# Patient Record
Sex: FEMALE
[De-identification: names, ages, dates, MRNs, and addresses within clinical notes are randomized per-mention and may not be internally consistent; named-entity substitution may affect disease eponyms.]

---

## 2018-09-28 ENCOUNTER — HOSPITAL ENCOUNTER (EMERGENCY)
Dept: HOSPITAL 82 - ED | Age: 53
Discharge: HOME | DRG: 563 | End: 2018-09-28
Payer: COMMERCIAL

## 2018-09-28 VITALS — DIASTOLIC BLOOD PRESSURE: 85 MMHG | SYSTOLIC BLOOD PRESSURE: 112 MMHG

## 2018-09-28 VITALS — WEIGHT: 178.57 LBS | BODY MASS INDEX: 30.49 KG/M2 | HEIGHT: 64 IN

## 2018-09-28 DIAGNOSIS — M19.90: ICD-10-CM

## 2018-09-28 DIAGNOSIS — M35.00: ICD-10-CM
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
## 2018-09-28 DIAGNOSIS — S92.502A: Primary | ICD-10-CM

## 2018-09-28 DIAGNOSIS — S43.402A: ICD-10-CM
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
## 2018-09-28 DIAGNOSIS — E11.9: ICD-10-CM

## 2018-09-28 DIAGNOSIS — M79.7: ICD-10-CM

## 2018-09-28 DIAGNOSIS — M06.9: ICD-10-CM

## 2021-08-14 ENCOUNTER — HOSPITAL ENCOUNTER (INPATIENT)
Dept: HOSPITAL 82 - ED | Age: 56
LOS: 20 days | Discharge: HOME | DRG: 207 | End: 2021-09-03
Attending: INTERNAL MEDICINE | Admitting: INTERNAL MEDICINE
Payer: COMMERCIAL

## 2021-08-14 VITALS — SYSTOLIC BLOOD PRESSURE: 118 MMHG | DIASTOLIC BLOOD PRESSURE: 77 MMHG

## 2021-08-14 VITALS — DIASTOLIC BLOOD PRESSURE: 80 MMHG | SYSTOLIC BLOOD PRESSURE: 122 MMHG

## 2021-08-14 VITALS — WEIGHT: 163.58 LBS | HEIGHT: 64 IN | BODY MASS INDEX: 27.93 KG/M2

## 2021-08-14 VITALS — SYSTOLIC BLOOD PRESSURE: 118 MMHG | DIASTOLIC BLOOD PRESSURE: 79 MMHG

## 2021-08-14 DIAGNOSIS — E11.9: ICD-10-CM

## 2021-08-14 DIAGNOSIS — J12.82: ICD-10-CM

## 2021-08-14 DIAGNOSIS — M06.9: ICD-10-CM

## 2021-08-14 DIAGNOSIS — E87.6: ICD-10-CM

## 2021-08-14 DIAGNOSIS — J96.01: ICD-10-CM

## 2021-08-14 DIAGNOSIS — E87.70: ICD-10-CM

## 2021-08-14 DIAGNOSIS — Z63.4: ICD-10-CM

## 2021-08-14 DIAGNOSIS — M19.90: ICD-10-CM

## 2021-08-14 DIAGNOSIS — M35.00: ICD-10-CM

## 2021-08-14 DIAGNOSIS — Z79.4: ICD-10-CM

## 2021-08-14 DIAGNOSIS — F32.9: ICD-10-CM

## 2021-08-14 DIAGNOSIS — U07.1: Primary | ICD-10-CM

## 2021-08-14 DIAGNOSIS — M79.7: ICD-10-CM

## 2021-08-14 LAB
ALBUMIN SERPL-MCNC: 3.4 G/DL (ref 3.2–5)
ALP SERPL-CCNC: 174 U/L (ref 38–126)
ANION GAP SERPL CALCULATED.3IONS-SCNC: 13 MMOL/L
AST SERPL-CCNC: 137 U/L (ref 14–36)
BASOPHILS NFR BLD AUTO: 0 % (ref 0–3)
BUN SERPL-MCNC: 11 MG/DL (ref 7–17)
BUN/CREAT SERPL: 24
CHLORIDE SERPL-SCNC: 102 MMOL/L (ref 95–108)
CO2 SERPL-SCNC: 26 MMOL/L (ref 22–30)
CREAT SERPL-MCNC: 0.4 MG/DL (ref 0.5–1)
EOSINOPHIL NFR BLD AUTO: 0 % (ref 0–8)
ERYTHROCYTE [DISTWIDTH] IN BLOOD BY AUTOMATED COUNT: 14.9 % (ref 11.5–15.5)
HCT VFR BLD AUTO: 44 % (ref 37–47)
HGB BLD-MCNC: 14.1 G/DL (ref 12–16)
IMM GRANULOCYTES NFR BLD: 0.4 % (ref 0–5)
LYMPHOCYTES NFR BLD: 25 % (ref 15–41)
MCH RBC QN AUTO: 29.9 PG  CALC (ref 26–32)
MCHC RBC AUTO-ENTMCNC: 32 G/DL CAL (ref 32–36)
MCV RBC AUTO: 93.2 FL  CALC (ref 80–100)
MONOCYTES NFR BLD AUTO: 9 % (ref 2–13)
MYOGLOBIN SERPL-MCNC: 28 NG/ML (ref 0–62)
NEUTROPHILS # BLD AUTO: 1.47 THOU/UL (ref 2–7.15)
NEUTROPHILS NFR BLD AUTO: 65 % (ref 42–76)
PLATELET # BLD AUTO: 109 THOU/UL (ref 130–400)
POTASSIUM SERPL-SCNC: 3.8 MMOL/L (ref 3.5–5.1)
PROT SERPL-MCNC: 6.8 G/DL (ref 6.3–8.2)
RBC # BLD AUTO: 4.72 MILL/UL (ref 4.2–5.6)
SODIUM SERPL-SCNC: 137 MMOL/L (ref 137–146)

## 2021-08-14 PROCEDURE — S0164 INJECTION PANTROPRAZOLE: HCPCS

## 2021-08-14 SDOH — SOCIAL STABILITY - SOCIAL INSECURITY: DISSAPEARANCE AND DEATH OF FAMILY MEMBER: Z63.4

## 2021-08-14 NOTE — NUR
[facility] INCREASED TO 6L/M VIA NC BY RT.
SATS 92%. MD AWARE.

## 2021-08-14 NOTE — NUR
PATIENT SEEN IN OUTSIDE WAITING AREA OXYGEN SATURATION 89% ON ROOM AIR AND
PULSE 92. MD NOTIFIED AND VERBAL ORDERSRECIEVED FOR OXYGEN. PATIENT PLACED ON
O2 OXYGEN SATURATION 95% ON 2 LITERS OXYGEN

## 2021-08-14 NOTE — NUR
PT A/O X3. PERRLA. WEAK HAND GRASPS. STRONG RADIAL AND PEDAL PULSES. #20 LAC
NS @100. IV SITE HEALTHY.  O2 @6L HIGH FLOW; PT MAINTAING 93%. TELE IN PLACE.
UPPER LUNGS CLEAR, LOWER LOBES DIMINISHED. DRY COUGH NOTED. LABORED BREATHING.
NO C/O PAIN. HOB ELEVATED. BOWEL SOUNDS ACTIVE X4. LAST BM 8/14/21. SKIN
INTACT. SAFETY PRECAUTIONS IN PLACE. CALL LIGHT IN REACH. WILL CONTINUE TO
MONITOR.

## 2021-08-15 VITALS — DIASTOLIC BLOOD PRESSURE: 70 MMHG | SYSTOLIC BLOOD PRESSURE: 114 MMHG

## 2021-08-15 VITALS — SYSTOLIC BLOOD PRESSURE: 122 MMHG | DIASTOLIC BLOOD PRESSURE: 76 MMHG

## 2021-08-15 VITALS — DIASTOLIC BLOOD PRESSURE: 74 MMHG | SYSTOLIC BLOOD PRESSURE: 133 MMHG

## 2021-08-15 VITALS — SYSTOLIC BLOOD PRESSURE: 131 MMHG | DIASTOLIC BLOOD PRESSURE: 80 MMHG

## 2021-08-15 VITALS — DIASTOLIC BLOOD PRESSURE: 80 MMHG | SYSTOLIC BLOOD PRESSURE: 119 MMHG

## 2021-08-15 VITALS — SYSTOLIC BLOOD PRESSURE: 115 MMHG | DIASTOLIC BLOOD PRESSURE: 78 MMHG

## 2021-08-15 VITALS — SYSTOLIC BLOOD PRESSURE: 120 MMHG | DIASTOLIC BLOOD PRESSURE: 64 MMHG

## 2021-08-15 VITALS — DIASTOLIC BLOOD PRESSURE: 76 MMHG | SYSTOLIC BLOOD PRESSURE: 126 MMHG

## 2021-08-15 VITALS — DIASTOLIC BLOOD PRESSURE: 78 MMHG | SYSTOLIC BLOOD PRESSURE: 126 MMHG

## 2021-08-15 VITALS — DIASTOLIC BLOOD PRESSURE: 70 MMHG | SYSTOLIC BLOOD PRESSURE: 119 MMHG

## 2021-08-15 VITALS — SYSTOLIC BLOOD PRESSURE: 130 MMHG | DIASTOLIC BLOOD PRESSURE: 76 MMHG

## 2021-08-15 VITALS — DIASTOLIC BLOOD PRESSURE: 69 MMHG | SYSTOLIC BLOOD PRESSURE: 114 MMHG

## 2021-08-15 LAB
ALBUMIN SERPL-MCNC: 2.9 G/DL (ref 3.2–5)
ALP SERPL-CCNC: 150 U/L (ref 38–126)
ANION GAP SERPL CALCULATED.3IONS-SCNC: 14 MMOL/L
AST SERPL-CCNC: 89 U/L (ref 14–36)
BASOPHILS NFR BLD AUTO: 0 % (ref 0–3)
BUN SERPL-MCNC: 14 MG/DL (ref 7–17)
BUN/CREAT SERPL: 39
CHLORIDE SERPL-SCNC: 106 MMOL/L (ref 95–108)
CO2 SERPL-SCNC: 22 MMOL/L (ref 22–30)
CREAT SERPL-MCNC: 0.4 MG/DL (ref 0.5–1)
CRP SERPL-MCNC: 6.2 MG/DL (ref 0–0.9)
EOSINOPHIL NFR BLD AUTO: 0 % (ref 0–8)
ERYTHROCYTE [DISTWIDTH] IN BLOOD BY AUTOMATED COUNT: 14.9 % (ref 11.5–15.5)
HCT VFR BLD AUTO: 41.5 % (ref 37–47)
HGB BLD-MCNC: 13.1 G/DL (ref 12–16)
IMM GRANULOCYTES NFR BLD: 0 % (ref 0–5)
LYMPHOCYTES NFR BLD: 37 % (ref 15–41)
MCH RBC QN AUTO: 29.4 PG  CALC (ref 26–32)
MCHC RBC AUTO-ENTMCNC: 31.6 G/DL CAL (ref 32–36)
MCV RBC AUTO: 93 FL  CALC (ref 80–100)
MONOCYTES NFR BLD AUTO: 14 % (ref 2–13)
NEUTROPHILS # BLD AUTO: 0.65 THOU/UL (ref 2–7.15)
NEUTROPHILS NFR BLD AUTO: 49 % (ref 42–76)
PLATELET # BLD AUTO: 116 THOU/UL (ref 130–400)
POTASSIUM SERPL-SCNC: 4.1 MMOL/L (ref 3.5–5.1)
PROT SERPL-MCNC: 5.9 G/DL (ref 6.3–8.2)
RBC # BLD AUTO: 4.46 MILL/UL (ref 4.2–5.6)
SODIUM SERPL-SCNC: 138 MMOL/L (ref 137–146)

## 2021-08-15 PROCEDURE — XW033E5 INTRODUCTION OF REMDESIVIR ANTI-INFECTIVE INTO PERIPHERAL VEIN, PERCUTANEOUS APPROACH, NEW TECHNOLOGY GROUP 5: ICD-10-PCS | Performed by: INTERNAL MEDICINE

## 2021-08-15 NOTE — NUR
PT A/O X3. PT C/O CHEST PAIN WHEN DEEP BREATHING; 5/10 ON PAIN SCALE.
REPOSITIONED FOR COMFORT. TELE IN PLACE. O2 @12 L ON PT. NONPRODUCTIVE COUGH
NOTED. O2 STATING 90-91%. # 20 LAC NS @100. IV SITE HEALTHY. ALL LUNG FIELDS
W/ CRACKLES NOTED. STRONG RADIAL AND PEDAL PULSES. BOWEL SOUNDS ACTIVE X4.
LAST BM8/14/21. SKIN INTACT. NO EDEMA PRESENT AT THIS TIME. POC DISCUSSED
PT. SAFETY PRECAUTIONS IN PLACE. CALL LIGHT IN REACH. WILL CONTINUE TO
MONITOR.

## 2021-08-15 NOTE — NUR
pulse ox is 78%. pt laying on lt side. instructed pt prone & she complied. o2
sat increased to 89% on 15 liters high flow. instructed pt on cough etiquette.
cardiac monitor shows sinus rhythm. #20 lac ns infusing @ 100cchr. po fluids
taken poor. voids per bsc.  fall & air/contact precautions cont.

## 2021-08-15 NOTE — NUR
call received from lab.  critical lab results
was reported to nurse. wbc went down to
1.3 Md Garcia made aware. no neww order received.

## 2021-08-15 NOTE — NUR
PATIENT IS A/OX3, ABLE TO MAKE NEEDS KNOWN. PERRLA 3MM. COMPLAINS OF PAIN
WHEN SHE TAKES A DEEP BREATH. DIMINISHED LUNG SOUNDS. NORMAL HEART RATE.
ACTIVE BOWEL SOUNDS. NO EDEMA PRESENT AT THIS TIME. STRONG/EQUAL HAND .
STRONG PULSES. SAFETY MEASURES IN PLACE. CALL LIGHT IN REACH WILL CONTINUE TO
MONITOR. PATIENT IS CURRENTLY PRONE, O2 SATS 98 AT THIS TIME.

## 2021-08-15 NOTE — NUR
PT RESTING IN PRONE POSITION. O2 @ 9L ON PT; 93%. NO C/O AT THIS TIME. CALL
LIGHT IN REACH. WILL CONTINUE TO MONITOR.

## 2021-08-15 NOTE — NUR
PT ON 12 L OF O2 LYING ON HER RIGHT SIDE. INSTRUCTED PT TO LIE IN OPRONE
POSITION TO HELP WITH HER LUNGS. PT REPOSITION; PT STATES LYING ON HER BELLY
IS COMFORTABLE. O2 DROPPED TO 9L AND STATING 90-91%. ADVISED PT TO STAY IN
POSITION AS LONG AS TOLERATED. CALL LIGHT IN REACH. WILL CONTINUE TO MONITOR.

## 2021-08-15 NOTE — NUR
up per self to bsc. pulse ox 67%. encouraged pt to quickly finish then return
to bed in the prone position. pt complied. pulse ox 89%.

## 2021-08-16 VITALS — SYSTOLIC BLOOD PRESSURE: 115 MMHG | DIASTOLIC BLOOD PRESSURE: 63 MMHG

## 2021-08-16 VITALS — SYSTOLIC BLOOD PRESSURE: 122 MMHG | DIASTOLIC BLOOD PRESSURE: 76 MMHG

## 2021-08-16 VITALS — DIASTOLIC BLOOD PRESSURE: 79 MMHG | SYSTOLIC BLOOD PRESSURE: 115 MMHG

## 2021-08-16 VITALS — SYSTOLIC BLOOD PRESSURE: 112 MMHG | DIASTOLIC BLOOD PRESSURE: 67 MMHG

## 2021-08-16 VITALS — SYSTOLIC BLOOD PRESSURE: 137 MMHG | DIASTOLIC BLOOD PRESSURE: 75 MMHG

## 2021-08-16 VITALS — DIASTOLIC BLOOD PRESSURE: 51 MMHG | SYSTOLIC BLOOD PRESSURE: 101 MMHG

## 2021-08-16 VITALS — DIASTOLIC BLOOD PRESSURE: 75 MMHG | SYSTOLIC BLOOD PRESSURE: 129 MMHG

## 2021-08-16 VITALS — SYSTOLIC BLOOD PRESSURE: 109 MMHG | DIASTOLIC BLOOD PRESSURE: 68 MMHG

## 2021-08-16 VITALS — DIASTOLIC BLOOD PRESSURE: 76 MMHG | SYSTOLIC BLOOD PRESSURE: 118 MMHG

## 2021-08-16 VITALS — SYSTOLIC BLOOD PRESSURE: 121 MMHG | DIASTOLIC BLOOD PRESSURE: 74 MMHG

## 2021-08-16 VITALS — DIASTOLIC BLOOD PRESSURE: 65 MMHG | SYSTOLIC BLOOD PRESSURE: 126 MMHG

## 2021-08-16 VITALS — SYSTOLIC BLOOD PRESSURE: 115 MMHG | DIASTOLIC BLOOD PRESSURE: 69 MMHG

## 2021-08-16 VITALS — SYSTOLIC BLOOD PRESSURE: 132 MMHG | DIASTOLIC BLOOD PRESSURE: 74 MMHG

## 2021-08-16 VITALS — DIASTOLIC BLOOD PRESSURE: 82 MMHG | SYSTOLIC BLOOD PRESSURE: 137 MMHG

## 2021-08-16 LAB
ALBUMIN SERPL-MCNC: 2.8 G/DL (ref 3.2–5)
ALP SERPL-CCNC: 133 U/L (ref 38–126)
ANION GAP SERPL CALCULATED.3IONS-SCNC: 10 MMOL/L
AST SERPL-CCNC: 78 U/L (ref 14–36)
BASOPHILS NFR BLD AUTO: 0 % (ref 0–3)
BUN SERPL-MCNC: 19 MG/DL (ref 7–17)
BUN/CREAT SERPL: 55
CHLORIDE SERPL-SCNC: 110 MMOL/L (ref 95–108)
CO2 SERPL-SCNC: 25 MMOL/L (ref 22–30)
CREAT SERPL-MCNC: 0.3 MG/DL (ref 0.5–1)
EOSINOPHIL NFR BLD AUTO: 0 % (ref 0–8)
ERYTHROCYTE [DISTWIDTH] IN BLOOD BY AUTOMATED COUNT: 14.8 % (ref 11.5–15.5)
HCT VFR BLD AUTO: 42.1 % (ref 37–47)
HGB BLD-MCNC: 13.3 G/DL (ref 12–16)
IMM GRANULOCYTES NFR BLD: 0.5 % (ref 0–5)
LYMPHOCYTES NFR BLD: 16 % (ref 15–41)
MCH RBC QN AUTO: 29.6 PG  CALC (ref 26–32)
MCHC RBC AUTO-ENTMCNC: 31.6 G/DL CAL (ref 32–36)
MCV RBC AUTO: 93.8 FL  CALC (ref 80–100)
MONOCYTES NFR BLD AUTO: 11 % (ref 2–13)
NEUTROPHILS # BLD AUTO: 3.19 THOU/UL (ref 2–7.15)
NEUTROPHILS NFR BLD AUTO: 73 % (ref 42–76)
PLATELET # BLD AUTO: 147 THOU/UL (ref 130–400)
POTASSIUM SERPL-SCNC: 3.9 MMOL/L (ref 3.5–5.1)
PROT SERPL-MCNC: 6 G/DL (ref 6.3–8.2)
RBC # BLD AUTO: 4.49 MILL/UL (ref 4.2–5.6)
SODIUM SERPL-SCNC: 141 MMOL/L (ref 137–146)

## 2021-08-16 PROCEDURE — 5A09357 ASSISTANCE WITH RESPIRATORY VENTILATION, LESS THAN 24 CONSECUTIVE HOURS, CONTINUOUS POSITIVE AIRWAY PRESSURE: ICD-10-PCS | Performed by: STUDENT IN AN ORGANIZED HEALTH CARE EDUCATION/TRAINING PROGRAM

## 2021-08-16 NOTE — NUR
pulse ox 60%. pt on bsc. encouraged pt to finish quickly then return to bed in
prone position. pt complied. after approx 1 minute pulse ox is 86%.

## 2021-08-16 NOTE — NUR
Patient is prone position. Patient ask if she is having any pain and stated
no. Will continue to monitor.

## 2021-08-16 NOTE — NUR
REPORT GIVEN BY JOSE RAFAEL. PATIENT RESTING IN BED WITH NON-REBREATHER IN PLACE
AT 15L. RESP LABORED AND SHALLOW. PATIENT EDUCATED ON DEEP BREATHING,
CONTIUNES TO TAKE SHALLOW BREATH THROUGH HER MOUTH. FALL AND SAFTEY
PRECAUTIONS IN PLACE. IV INFUSING FLUIDS. NSR ON TELE. PLAN OF CARE
DISCUCSSED. PATIENT INFORMED TO CALL WITH ANY QUESTIONS OR CONCERNS.

## 2021-08-16 NOTE — NUR
RT CALLED TO BEDSIDE. PATIENT'S O2 SAT 57%, PATIENT IS EXPERIENCING SOB.
PATIENT HAS AGREED TO BIPAP AT THIS TIME.

## 2021-08-16 NOTE — NUR
Patients daughter was updated on patient status. Patient is having WOB.
Respitory called due to patient having a hard time breathing through her nose
and mouth breathing. NON-Rebreather was place to bring O2 up from low 40s
current O2 is at 88. Patient is still on prone position. Will continue to
monitor.

## 2021-08-16 NOTE — NUR
Patient is resting with complaint of nausea. Ginger Ale and cold wash cloth
was provided. Patient reposition and encourage to go prone on the bed after
nausea improves. Will continue to monitor.

## 2021-08-16 NOTE — NUR
Patient reposition oxygenation is continuing to stay in the germán 80s. Still
in prone position. When awake oxygenation in the low 90s. Patient is
continuing to have loose stools. Patient reported having nausea PRN zofran
provided. Will continue to monitor.

## 2021-08-17 VITALS — DIASTOLIC BLOOD PRESSURE: 81 MMHG | SYSTOLIC BLOOD PRESSURE: 166 MMHG

## 2021-08-17 VITALS — DIASTOLIC BLOOD PRESSURE: 74 MMHG | SYSTOLIC BLOOD PRESSURE: 135 MMHG

## 2021-08-17 VITALS — DIASTOLIC BLOOD PRESSURE: 78 MMHG | SYSTOLIC BLOOD PRESSURE: 158 MMHG

## 2021-08-17 VITALS — SYSTOLIC BLOOD PRESSURE: 161 MMHG | DIASTOLIC BLOOD PRESSURE: 95 MMHG

## 2021-08-17 VITALS — SYSTOLIC BLOOD PRESSURE: 130 MMHG | DIASTOLIC BLOOD PRESSURE: 81 MMHG

## 2021-08-17 VITALS — SYSTOLIC BLOOD PRESSURE: 124 MMHG | DIASTOLIC BLOOD PRESSURE: 69 MMHG

## 2021-08-17 VITALS — DIASTOLIC BLOOD PRESSURE: 93 MMHG | SYSTOLIC BLOOD PRESSURE: 144 MMHG

## 2021-08-17 VITALS — SYSTOLIC BLOOD PRESSURE: 165 MMHG | DIASTOLIC BLOOD PRESSURE: 82 MMHG

## 2021-08-17 VITALS — SYSTOLIC BLOOD PRESSURE: 128 MMHG | DIASTOLIC BLOOD PRESSURE: 81 MMHG

## 2021-08-17 VITALS — SYSTOLIC BLOOD PRESSURE: 172 MMHG | DIASTOLIC BLOOD PRESSURE: 82 MMHG

## 2021-08-17 VITALS — DIASTOLIC BLOOD PRESSURE: 75 MMHG | SYSTOLIC BLOOD PRESSURE: 109 MMHG

## 2021-08-17 VITALS — DIASTOLIC BLOOD PRESSURE: 91 MMHG | SYSTOLIC BLOOD PRESSURE: 149 MMHG

## 2021-08-17 VITALS — SYSTOLIC BLOOD PRESSURE: 162 MMHG | DIASTOLIC BLOOD PRESSURE: 84 MMHG

## 2021-08-17 VITALS — SYSTOLIC BLOOD PRESSURE: 159 MMHG | DIASTOLIC BLOOD PRESSURE: 89 MMHG

## 2021-08-17 VITALS — SYSTOLIC BLOOD PRESSURE: 163 MMHG | DIASTOLIC BLOOD PRESSURE: 84 MMHG

## 2021-08-17 VITALS — DIASTOLIC BLOOD PRESSURE: 94 MMHG | SYSTOLIC BLOOD PRESSURE: 148 MMHG

## 2021-08-17 VITALS — SYSTOLIC BLOOD PRESSURE: 138 MMHG | DIASTOLIC BLOOD PRESSURE: 82 MMHG

## 2021-08-17 VITALS — DIASTOLIC BLOOD PRESSURE: 90 MMHG | SYSTOLIC BLOOD PRESSURE: 158 MMHG

## 2021-08-17 VITALS — SYSTOLIC BLOOD PRESSURE: 168 MMHG | DIASTOLIC BLOOD PRESSURE: 92 MMHG

## 2021-08-17 VITALS — DIASTOLIC BLOOD PRESSURE: 75 MMHG | SYSTOLIC BLOOD PRESSURE: 181 MMHG

## 2021-08-17 VITALS — DIASTOLIC BLOOD PRESSURE: 86 MMHG | SYSTOLIC BLOOD PRESSURE: 159 MMHG

## 2021-08-17 VITALS — SYSTOLIC BLOOD PRESSURE: 172 MMHG | DIASTOLIC BLOOD PRESSURE: 88 MMHG

## 2021-08-17 VITALS — SYSTOLIC BLOOD PRESSURE: 133 MMHG | DIASTOLIC BLOOD PRESSURE: 71 MMHG

## 2021-08-17 VITALS — DIASTOLIC BLOOD PRESSURE: 99 MMHG | SYSTOLIC BLOOD PRESSURE: 162 MMHG

## 2021-08-17 VITALS — SYSTOLIC BLOOD PRESSURE: 146 MMHG | DIASTOLIC BLOOD PRESSURE: 97 MMHG

## 2021-08-17 VITALS — DIASTOLIC BLOOD PRESSURE: 96 MMHG | SYSTOLIC BLOOD PRESSURE: 149 MMHG

## 2021-08-17 VITALS — SYSTOLIC BLOOD PRESSURE: 179 MMHG | DIASTOLIC BLOOD PRESSURE: 94 MMHG

## 2021-08-17 VITALS — DIASTOLIC BLOOD PRESSURE: 96 MMHG | SYSTOLIC BLOOD PRESSURE: 160 MMHG

## 2021-08-17 VITALS — SYSTOLIC BLOOD PRESSURE: 133 MMHG | DIASTOLIC BLOOD PRESSURE: 92 MMHG

## 2021-08-17 VITALS — SYSTOLIC BLOOD PRESSURE: 169 MMHG | DIASTOLIC BLOOD PRESSURE: 90 MMHG

## 2021-08-17 VITALS — SYSTOLIC BLOOD PRESSURE: 116 MMHG | DIASTOLIC BLOOD PRESSURE: 74 MMHG

## 2021-08-17 VITALS — DIASTOLIC BLOOD PRESSURE: 97 MMHG | SYSTOLIC BLOOD PRESSURE: 163 MMHG

## 2021-08-17 VITALS — DIASTOLIC BLOOD PRESSURE: 88 MMHG | SYSTOLIC BLOOD PRESSURE: 180 MMHG

## 2021-08-17 VITALS — SYSTOLIC BLOOD PRESSURE: 149 MMHG | DIASTOLIC BLOOD PRESSURE: 90 MMHG

## 2021-08-17 VITALS — SYSTOLIC BLOOD PRESSURE: 122 MMHG | DIASTOLIC BLOOD PRESSURE: 76 MMHG

## 2021-08-17 VITALS — DIASTOLIC BLOOD PRESSURE: 64 MMHG | SYSTOLIC BLOOD PRESSURE: 138 MMHG

## 2021-08-17 VITALS — DIASTOLIC BLOOD PRESSURE: 62 MMHG | SYSTOLIC BLOOD PRESSURE: 93 MMHG

## 2021-08-17 VITALS — SYSTOLIC BLOOD PRESSURE: 135 MMHG | DIASTOLIC BLOOD PRESSURE: 74 MMHG

## 2021-08-17 VITALS — DIASTOLIC BLOOD PRESSURE: 100 MMHG | SYSTOLIC BLOOD PRESSURE: 166 MMHG

## 2021-08-17 VITALS — DIASTOLIC BLOOD PRESSURE: 88 MMHG | SYSTOLIC BLOOD PRESSURE: 170 MMHG

## 2021-08-17 VITALS — DIASTOLIC BLOOD PRESSURE: 61 MMHG | SYSTOLIC BLOOD PRESSURE: 118 MMHG

## 2021-08-17 VITALS — SYSTOLIC BLOOD PRESSURE: 129 MMHG | DIASTOLIC BLOOD PRESSURE: 84 MMHG

## 2021-08-17 VITALS — SYSTOLIC BLOOD PRESSURE: 159 MMHG | DIASTOLIC BLOOD PRESSURE: 92 MMHG

## 2021-08-17 VITALS — SYSTOLIC BLOOD PRESSURE: 102 MMHG | DIASTOLIC BLOOD PRESSURE: 78 MMHG

## 2021-08-17 VITALS — DIASTOLIC BLOOD PRESSURE: 96 MMHG | SYSTOLIC BLOOD PRESSURE: 162 MMHG

## 2021-08-17 VITALS — SYSTOLIC BLOOD PRESSURE: 118 MMHG | DIASTOLIC BLOOD PRESSURE: 76 MMHG

## 2021-08-17 VITALS — SYSTOLIC BLOOD PRESSURE: 148 MMHG | DIASTOLIC BLOOD PRESSURE: 91 MMHG

## 2021-08-17 VITALS — SYSTOLIC BLOOD PRESSURE: 132 MMHG | DIASTOLIC BLOOD PRESSURE: 85 MMHG

## 2021-08-17 VITALS — SYSTOLIC BLOOD PRESSURE: 117 MMHG | DIASTOLIC BLOOD PRESSURE: 82 MMHG

## 2021-08-17 VITALS — SYSTOLIC BLOOD PRESSURE: 154 MMHG | DIASTOLIC BLOOD PRESSURE: 88 MMHG

## 2021-08-17 VITALS — DIASTOLIC BLOOD PRESSURE: 100 MMHG | SYSTOLIC BLOOD PRESSURE: 146 MMHG

## 2021-08-17 VITALS — DIASTOLIC BLOOD PRESSURE: 82 MMHG | SYSTOLIC BLOOD PRESSURE: 117 MMHG

## 2021-08-17 VITALS — DIASTOLIC BLOOD PRESSURE: 89 MMHG | SYSTOLIC BLOOD PRESSURE: 142 MMHG

## 2021-08-17 VITALS — DIASTOLIC BLOOD PRESSURE: 99 MMHG | SYSTOLIC BLOOD PRESSURE: 166 MMHG

## 2021-08-17 VITALS — DIASTOLIC BLOOD PRESSURE: 97 MMHG | SYSTOLIC BLOOD PRESSURE: 173 MMHG

## 2021-08-17 VITALS — DIASTOLIC BLOOD PRESSURE: 83 MMHG | SYSTOLIC BLOOD PRESSURE: 142 MMHG

## 2021-08-17 VITALS — DIASTOLIC BLOOD PRESSURE: 62 MMHG | SYSTOLIC BLOOD PRESSURE: 108 MMHG

## 2021-08-17 VITALS — SYSTOLIC BLOOD PRESSURE: 150 MMHG | DIASTOLIC BLOOD PRESSURE: 87 MMHG

## 2021-08-17 VITALS — DIASTOLIC BLOOD PRESSURE: 97 MMHG | SYSTOLIC BLOOD PRESSURE: 182 MMHG

## 2021-08-17 VITALS — DIASTOLIC BLOOD PRESSURE: 89 MMHG | SYSTOLIC BLOOD PRESSURE: 139 MMHG

## 2021-08-17 LAB
ALBUMIN SERPL-MCNC: 2.8 G/DL (ref 3.2–5)
ALP SERPL-CCNC: 132 U/L (ref 38–126)
ANION GAP SERPL CALCULATED.3IONS-SCNC: 7 MMOL/L
AST SERPL-CCNC: 69 U/L (ref 14–36)
BASOPHILS NFR BLD AUTO: 0 % (ref 0–3)
BILIRUB UR QL STRIP.AUTO: NEGATIVE
BUN SERPL-MCNC: 21 MG/DL (ref 7–17)
BUN/CREAT SERPL: 55
CHLORIDE SERPL-SCNC: 109 MMOL/L (ref 95–108)
CO2 SERPL-SCNC: 31 MMOL/L (ref 22–30)
COLOR UR AUTO: YELLOW
CREAT SERPL-MCNC: 0.4 MG/DL (ref 0.5–1)
CRP SERPL-MCNC: 3.3 MG/DL (ref 0–0.9)
EOSINOPHIL NFR BLD AUTO: 0 % (ref 0–8)
ERYTHROCYTE [DISTWIDTH] IN BLOOD BY AUTOMATED COUNT: 14.9 % (ref 11.5–15.5)
GLUCOSE UR STRIP.AUTO-MCNC: NEGATIVE MG/DL
HCT VFR BLD AUTO: 43.3 % (ref 37–47)
HGB BLD-MCNC: 13.7 G/DL (ref 12–16)
HGB UR QL STRIP.AUTO: (no result)
IMM GRANULOCYTES NFR BLD: 0.6 % (ref 0–5)
KETONES UR STRIP.AUTO-MCNC: 15 MG/DL
LEUKOCYTE ESTERASE UR QL STRIP.AUTO: NEGATIVE
LYMPHOCYTES NFR BLD: 12 % (ref 15–41)
MCH RBC QN AUTO: 29.7 PG  CALC (ref 26–32)
MCHC RBC AUTO-ENTMCNC: 31.6 G/DL CAL (ref 32–36)
MCV RBC AUTO: 93.7 FL  CALC (ref 80–100)
MONOCYTES NFR BLD AUTO: 10 % (ref 2–13)
NEUTROPHILS # BLD AUTO: 5.01 THOU/UL (ref 2–7.15)
NEUTROPHILS NFR BLD AUTO: 77 % (ref 42–76)
NITRITE UR QL STRIP.AUTO: NEGATIVE
PH UR STRIP.AUTO: 6 [PH] (ref 4.5–8)
PLATELET # BLD AUTO: 188 THOU/UL (ref 130–400)
POTASSIUM SERPL-SCNC: 3.5 MMOL/L (ref 3.5–5.1)
PROT SERPL-MCNC: 5.9 G/DL (ref 6.3–8.2)
PROT UR QL STRIP.AUTO: (no result) MG/DL
RBC # BLD AUTO: 4.62 MILL/UL (ref 4.2–5.6)
SODIUM SERPL-SCNC: 143 MMOL/L (ref 137–146)
SP GR UR STRIP.AUTO: 1.02
UROBILINOGEN UR QL STRIP.AUTO: 0.2 E.U./DL

## 2021-08-17 PROCEDURE — 5A1955Z RESPIRATORY VENTILATION, GREATER THAN 96 CONSECUTIVE HOURS: ICD-10-PCS | Performed by: STUDENT IN AN ORGANIZED HEALTH CARE EDUCATION/TRAINING PROGRAM

## 2021-08-17 PROCEDURE — 0BH17EZ INSERTION OF ENDOTRACHEAL AIRWAY INTO TRACHEA, VIA NATURAL OR ARTIFICIAL OPENING: ICD-10-PCS | Performed by: STUDENT IN AN ORGANIZED HEALTH CARE EDUCATION/TRAINING PROGRAM

## 2021-08-17 PROCEDURE — 02HV33Z INSERTION OF INFUSION DEVICE INTO SUPERIOR VENA CAVA, PERCUTANEOUS APPROACH: ICD-10-PCS | Performed by: SURGERY

## 2021-08-17 NOTE — NUR
PATIENT RESTING IN BED WITH EYES CLOSED. RESP EVEN AND UNLABORED. NO S/S OF
DISTRESS NOTED. FALL AND SAFTEY PRECAUTIONS IN PLACE. BIPAP IN PLACE.

## 2021-08-17 NOTE — NUR
ASSSISTED PHYSIICAN IN INTUBATION AND STATED PATIENT ON MECHANICAL
VENTILATION. PT REQUIRED SUCTIONING OF STAN BLOOD FROM THE ETT. TOTAL TIME
SPENT WAS 1 HOUR.

## 2021-08-17 NOTE — NUR
PT MOVED TO AIR MATRESS BED. PT HAD MODERATED LIQUID BM. PT GIVEN BED BATH. PT
TOLERATED TRANSFER WELL.

## 2021-08-17 NOTE — NUR
CALLED [facility] AT [phone] SPOKE TO JAHAIRA GAVE PT INFORMATION AND WAS
GIVEN A CONFIRMATION NUMBER [phone].

## 2021-08-17 NOTE — NUR
THIS WRITER INTO ROOM FOR AM ASSESSMENT. PATIENT VERY ANXIOUS. PATIENT STATES
"AM I GOING TO DIE" PATIENT VERY SCARED AND VERY ANXIOUS. PATIENT STATES SHE
HAS BEEN INCONTIENT. DR PRETTY PHONED FOR MEDICATION FOR ANXIETY. NEW ORDERS
RECEIVED.

## 2021-08-17 NOTE — NUR
PATIENT REMAINS INTUBATED ON VENT. SEDATED ON PRPOFOL AND VERSED GTTS. RASS
-5. SOFT WRIST RESTRAINTS IN PLACE BILATERALLY. PATIENT IS ON AN AIR MATTRESS.
SCDS IN PLACE BILATERALLY. CARDIAC MONITOR SHOWS SR. SHIFT ASSESSMENT
COMPLETED.

## 2021-08-17 NOTE — NUR
CALLED  FOR CENTRAL LINE PLACEMENT FOR THIS PT. OFFICE NUMBER
[phone]. SPOKE TO AZALIA. SHE STATED SHE WILL SEND HIM A TEXT FOR THE PT
AND GIVEN THE FLOOR A CALL BACK [phone].

## 2021-08-17 NOTE — NUR
PATIENT HAVING LABORED RESPIRATIONS. RESPIRATORY RATE 50-60. PATIENT
ENCOURAGED TO SLOW RESPIRATEIONS. STEPHEN ATTEMPTS X2 BY ICU NURSING STAFF WERE
UNSUCCESSFUL. PHONED OR STAFF TO COME AND ASSIST. ROCCO RN PLACED 12 FR STEPHEN.
UA OBTAINED AND SENT TO LAB. URINE IS MARIO ALBERTO WITH SEDIMENT.

## 2021-08-17 NOTE — NUR
INTUBATION AS FOLLOWS
0915- #20 PLACED RAC
0920- ETMIDATE 20MG GIVEN IV PUSH
0921- SUCC 80MG GIVEN IV PUSH
0928- 8.0 FR ET TUBE PLACED @24 LIP LINE BY DR ALFARO +CO2 +AUSCULTATION BILAT
0935- 14FR OG PLACED AND PLACED TO LIS
0935- PATIENT PLACED ON PROPOFOL DRIP
0945- RADIOOGY AT BEDSIDE FOR PORTABLE CXR
1000- RESTRAINTS IN PLACE FOR PROTECTION OF ET TUBE

## 2021-08-17 NOTE — NUR
DR PRETTY INTO ROOM TO SEE PATIENT. PATIENT HAVING LABORED BREATHING ON
BIPAP. TACHYPNEA NOTED. DR PRETTY DISCUSSED THE NEED FOR INTUBATION AT THIS
TIME. PATIENT AGREED TO INTUBATION.

## 2021-08-18 VITALS — SYSTOLIC BLOOD PRESSURE: 119 MMHG | DIASTOLIC BLOOD PRESSURE: 78 MMHG

## 2021-08-18 VITALS — DIASTOLIC BLOOD PRESSURE: 81 MMHG | SYSTOLIC BLOOD PRESSURE: 124 MMHG

## 2021-08-18 VITALS — SYSTOLIC BLOOD PRESSURE: 118 MMHG | DIASTOLIC BLOOD PRESSURE: 82 MMHG

## 2021-08-18 VITALS — SYSTOLIC BLOOD PRESSURE: 124 MMHG | DIASTOLIC BLOOD PRESSURE: 84 MMHG

## 2021-08-18 VITALS — DIASTOLIC BLOOD PRESSURE: 81 MMHG | SYSTOLIC BLOOD PRESSURE: 138 MMHG

## 2021-08-18 VITALS — SYSTOLIC BLOOD PRESSURE: 145 MMHG | DIASTOLIC BLOOD PRESSURE: 84 MMHG

## 2021-08-18 VITALS — DIASTOLIC BLOOD PRESSURE: 74 MMHG | SYSTOLIC BLOOD PRESSURE: 107 MMHG

## 2021-08-18 VITALS — DIASTOLIC BLOOD PRESSURE: 78 MMHG | SYSTOLIC BLOOD PRESSURE: 113 MMHG

## 2021-08-18 VITALS — SYSTOLIC BLOOD PRESSURE: 108 MMHG | DIASTOLIC BLOOD PRESSURE: 75 MMHG

## 2021-08-18 VITALS — SYSTOLIC BLOOD PRESSURE: 116 MMHG | DIASTOLIC BLOOD PRESSURE: 78 MMHG

## 2021-08-18 VITALS — SYSTOLIC BLOOD PRESSURE: 125 MMHG | DIASTOLIC BLOOD PRESSURE: 71 MMHG

## 2021-08-18 VITALS — DIASTOLIC BLOOD PRESSURE: 77 MMHG | SYSTOLIC BLOOD PRESSURE: 132 MMHG

## 2021-08-18 VITALS — SYSTOLIC BLOOD PRESSURE: 129 MMHG | DIASTOLIC BLOOD PRESSURE: 79 MMHG

## 2021-08-18 VITALS — SYSTOLIC BLOOD PRESSURE: 137 MMHG | DIASTOLIC BLOOD PRESSURE: 79 MMHG

## 2021-08-18 VITALS — DIASTOLIC BLOOD PRESSURE: 87 MMHG | SYSTOLIC BLOOD PRESSURE: 127 MMHG

## 2021-08-18 VITALS — SYSTOLIC BLOOD PRESSURE: 123 MMHG | DIASTOLIC BLOOD PRESSURE: 77 MMHG

## 2021-08-18 VITALS — SYSTOLIC BLOOD PRESSURE: 119 MMHG | DIASTOLIC BLOOD PRESSURE: 79 MMHG

## 2021-08-18 VITALS — SYSTOLIC BLOOD PRESSURE: 129 MMHG | DIASTOLIC BLOOD PRESSURE: 86 MMHG

## 2021-08-18 VITALS — DIASTOLIC BLOOD PRESSURE: 80 MMHG | SYSTOLIC BLOOD PRESSURE: 124 MMHG

## 2021-08-18 VITALS — DIASTOLIC BLOOD PRESSURE: 75 MMHG | SYSTOLIC BLOOD PRESSURE: 112 MMHG

## 2021-08-18 VITALS — SYSTOLIC BLOOD PRESSURE: 140 MMHG | DIASTOLIC BLOOD PRESSURE: 92 MMHG

## 2021-08-18 VITALS — SYSTOLIC BLOOD PRESSURE: 119 MMHG | DIASTOLIC BLOOD PRESSURE: 86 MMHG

## 2021-08-18 VITALS — SYSTOLIC BLOOD PRESSURE: 113 MMHG | DIASTOLIC BLOOD PRESSURE: 78 MMHG

## 2021-08-18 LAB
ALBUMIN SERPL-MCNC: 2.4 G/DL (ref 3.2–5)
ALP SERPL-CCNC: 129 U/L (ref 38–126)
ANION GAP SERPL CALCULATED.3IONS-SCNC: 7 MMOL/L
AST SERPL-CCNC: 56 U/L (ref 14–36)
BASOPHILS NFR BLD AUTO: 0 % (ref 0–3)
BUN SERPL-MCNC: 16 MG/DL (ref 7–17)
BUN/CREAT SERPL: 54
CHLORIDE SERPL-SCNC: 108 MMOL/L (ref 95–108)
CO2 SERPL-SCNC: 30 MMOL/L (ref 22–30)
CREAT SERPL-MCNC: 0.3 MG/DL (ref 0.5–1)
CRP SERPL-MCNC: 14.6 MG/DL (ref 0–0.9)
EOSINOPHIL NFR BLD AUTO: 0 % (ref 0–8)
ERYTHROCYTE [DISTWIDTH] IN BLOOD BY AUTOMATED COUNT: 15 % (ref 11.5–15.5)
HCT VFR BLD AUTO: 40.5 % (ref 37–47)
HGB BLD-MCNC: 12.9 G/DL (ref 12–16)
IMM GRANULOCYTES NFR BLD: 0.7 % (ref 0–5)
LYMPHOCYTES NFR BLD: 14 % (ref 15–41)
MCH RBC QN AUTO: 29.5 PG  CALC (ref 26–32)
MCHC RBC AUTO-ENTMCNC: 31.9 G/DL CAL (ref 32–36)
MCV RBC AUTO: 92.7 FL  CALC (ref 80–100)
MONOCYTES NFR BLD AUTO: 5 % (ref 2–13)
NEUTROPHILS # BLD AUTO: 5.71 THOU/UL (ref 2–7.15)
NEUTROPHILS NFR BLD AUTO: 80 % (ref 42–76)
PLATELET # BLD AUTO: 203 THOU/UL (ref 130–400)
POTASSIUM SERPL-SCNC: 3.5 MMOL/L (ref 3.5–5.1)
PROT SERPL-MCNC: 5.3 G/DL (ref 6.3–8.2)
RBC # BLD AUTO: 4.37 MILL/UL (ref 4.2–5.6)
SODIUM SERPL-SCNC: 141 MMOL/L (ref 137–146)

## 2021-08-18 NOTE — NUR
PT RESTING IN BED INTUBATED AND SEDATED AT THIS TIME. VSS ON MONITOR. WILL
CONTINUE TO MONITOR CLOSELY.

## 2021-08-18 NOTE — NUR
RESTING IN BED IN SUPINE POSITION, HOB ELEVATED. REMAINS INTUBATED AND
SEDATED. RASS -4. LSCTLC IN PLACE WITH NS AT 50 ML/HR, VERSED GTT AT 2 MG/HR,
AND PROPOFOL GTT AT 60 MCG/KG/MIN. BREATH SOUNDS DIMINISHED/COARSE. SUX ETT
FOR SCANT SECRETIONS AND ORALLY FOR MODERATE RUST COLORED SECRETIONS. ORAL
CARE PROVIDED. CARDIAC MONITOR SHOWS SB-SR. SHIFT ASSESSMENT COMPLETED. VSS.
O2 SAT 91%

## 2021-08-18 NOTE — NUR
DR PRETTY AT BEDSIDE. PLAN OF CARE DISCUSSED. WILL HOLD ON AWAKENING TRIAL
FOR TODAY AND ALLOW PATIENT TIME TO REST ON VENT. PLAN FOR AWAKENING TRIAL FOR
TOMORROW.

## 2021-08-18 NOTE — NUR
PT RESTING IN BED INTUBATED AND SEDATED. VSS ON MONITOR. CALL LIGHT IN REACH.
WILL CONTINUE TO MONTIOR.

## 2021-08-18 NOTE — NUR
PT RESTING IN BED INTUBATED AND SEDATED. SHIFT ASSESSMENT COMPLETED AT THIS
TIME. IV PATENT X3. VSS ON MONITOR. CALL LIGHT IN REACH. WILL CONTINUE TO
MONITOR.

## 2021-08-18 NOTE — NUR
PATIENT TURNED TO PRONE POSITION WITH ASSIST OF 4 NURSES, 1 CNA AND RT. O2 SAT
INITIALLY DROPPED  BUT PICKED UP TO UPPER 98% WITHIN 5 MINUTES.

## 2021-08-19 VITALS — DIASTOLIC BLOOD PRESSURE: 78 MMHG | SYSTOLIC BLOOD PRESSURE: 123 MMHG

## 2021-08-19 VITALS — DIASTOLIC BLOOD PRESSURE: 74 MMHG | SYSTOLIC BLOOD PRESSURE: 122 MMHG

## 2021-08-19 VITALS — SYSTOLIC BLOOD PRESSURE: 138 MMHG | DIASTOLIC BLOOD PRESSURE: 73 MMHG

## 2021-08-19 VITALS — DIASTOLIC BLOOD PRESSURE: 77 MMHG | SYSTOLIC BLOOD PRESSURE: 140 MMHG

## 2021-08-19 VITALS — DIASTOLIC BLOOD PRESSURE: 63 MMHG | SYSTOLIC BLOOD PRESSURE: 114 MMHG

## 2021-08-19 VITALS — SYSTOLIC BLOOD PRESSURE: 136 MMHG | DIASTOLIC BLOOD PRESSURE: 80 MMHG

## 2021-08-19 VITALS — DIASTOLIC BLOOD PRESSURE: 83 MMHG | SYSTOLIC BLOOD PRESSURE: 148 MMHG

## 2021-08-19 VITALS — DIASTOLIC BLOOD PRESSURE: 71 MMHG | SYSTOLIC BLOOD PRESSURE: 132 MMHG

## 2021-08-19 VITALS — DIASTOLIC BLOOD PRESSURE: 79 MMHG | SYSTOLIC BLOOD PRESSURE: 144 MMHG

## 2021-08-19 VITALS — SYSTOLIC BLOOD PRESSURE: 136 MMHG | DIASTOLIC BLOOD PRESSURE: 76 MMHG

## 2021-08-19 VITALS — DIASTOLIC BLOOD PRESSURE: 75 MMHG | SYSTOLIC BLOOD PRESSURE: 132 MMHG

## 2021-08-19 VITALS — SYSTOLIC BLOOD PRESSURE: 138 MMHG | DIASTOLIC BLOOD PRESSURE: 80 MMHG

## 2021-08-19 VITALS — SYSTOLIC BLOOD PRESSURE: 127 MMHG | DIASTOLIC BLOOD PRESSURE: 73 MMHG

## 2021-08-19 VITALS — SYSTOLIC BLOOD PRESSURE: 130 MMHG | DIASTOLIC BLOOD PRESSURE: 77 MMHG

## 2021-08-19 VITALS — SYSTOLIC BLOOD PRESSURE: 108 MMHG | DIASTOLIC BLOOD PRESSURE: 69 MMHG

## 2021-08-19 VITALS — DIASTOLIC BLOOD PRESSURE: 88 MMHG | SYSTOLIC BLOOD PRESSURE: 140 MMHG

## 2021-08-19 VITALS — DIASTOLIC BLOOD PRESSURE: 76 MMHG | SYSTOLIC BLOOD PRESSURE: 144 MMHG

## 2021-08-19 VITALS — DIASTOLIC BLOOD PRESSURE: 76 MMHG | SYSTOLIC BLOOD PRESSURE: 124 MMHG

## 2021-08-19 VITALS — DIASTOLIC BLOOD PRESSURE: 68 MMHG | SYSTOLIC BLOOD PRESSURE: 115 MMHG

## 2021-08-19 VITALS — SYSTOLIC BLOOD PRESSURE: 141 MMHG | DIASTOLIC BLOOD PRESSURE: 80 MMHG

## 2021-08-19 VITALS — DIASTOLIC BLOOD PRESSURE: 76 MMHG | SYSTOLIC BLOOD PRESSURE: 141 MMHG

## 2021-08-19 VITALS — SYSTOLIC BLOOD PRESSURE: 153 MMHG | DIASTOLIC BLOOD PRESSURE: 88 MMHG

## 2021-08-19 VITALS — DIASTOLIC BLOOD PRESSURE: 80 MMHG | SYSTOLIC BLOOD PRESSURE: 135 MMHG

## 2021-08-19 VITALS — DIASTOLIC BLOOD PRESSURE: 80 MMHG | SYSTOLIC BLOOD PRESSURE: 128 MMHG

## 2021-08-19 VITALS — SYSTOLIC BLOOD PRESSURE: 142 MMHG | DIASTOLIC BLOOD PRESSURE: 92 MMHG

## 2021-08-19 VITALS — DIASTOLIC BLOOD PRESSURE: 92 MMHG | SYSTOLIC BLOOD PRESSURE: 144 MMHG

## 2021-08-19 VITALS — SYSTOLIC BLOOD PRESSURE: 107 MMHG | DIASTOLIC BLOOD PRESSURE: 86 MMHG

## 2021-08-19 VITALS — SYSTOLIC BLOOD PRESSURE: 154 MMHG | DIASTOLIC BLOOD PRESSURE: 89 MMHG

## 2021-08-19 VITALS — SYSTOLIC BLOOD PRESSURE: 123 MMHG | DIASTOLIC BLOOD PRESSURE: 73 MMHG

## 2021-08-19 VITALS — SYSTOLIC BLOOD PRESSURE: 155 MMHG | DIASTOLIC BLOOD PRESSURE: 78 MMHG

## 2021-08-19 VITALS — SYSTOLIC BLOOD PRESSURE: 147 MMHG | DIASTOLIC BLOOD PRESSURE: 77 MMHG

## 2021-08-19 VITALS — SYSTOLIC BLOOD PRESSURE: 104 MMHG | DIASTOLIC BLOOD PRESSURE: 63 MMHG

## 2021-08-19 VITALS — SYSTOLIC BLOOD PRESSURE: 140 MMHG | DIASTOLIC BLOOD PRESSURE: 70 MMHG

## 2021-08-19 VITALS — SYSTOLIC BLOOD PRESSURE: 136 MMHG | DIASTOLIC BLOOD PRESSURE: 78 MMHG

## 2021-08-19 VITALS — SYSTOLIC BLOOD PRESSURE: 114 MMHG | DIASTOLIC BLOOD PRESSURE: 60 MMHG

## 2021-08-19 VITALS — SYSTOLIC BLOOD PRESSURE: 112 MMHG | DIASTOLIC BLOOD PRESSURE: 71 MMHG

## 2021-08-19 VITALS — DIASTOLIC BLOOD PRESSURE: 69 MMHG | SYSTOLIC BLOOD PRESSURE: 111 MMHG

## 2021-08-19 VITALS — DIASTOLIC BLOOD PRESSURE: 82 MMHG | SYSTOLIC BLOOD PRESSURE: 129 MMHG

## 2021-08-19 LAB
ALBUMIN SERPL-MCNC: 2.3 G/DL (ref 3.2–5)
ALP SERPL-CCNC: 116 U/L (ref 38–126)
ANION GAP SERPL CALCULATED.3IONS-SCNC: 6 MMOL/L
AST SERPL-CCNC: 39 U/L (ref 14–36)
BASOPHILS NFR BLD AUTO: 0 % (ref 0–3)
BUN SERPL-MCNC: 16 MG/DL (ref 7–17)
BUN/CREAT SERPL: 58
CHLORIDE SERPL-SCNC: 109 MMOL/L (ref 95–108)
CO2 SERPL-SCNC: 30 MMOL/L (ref 22–30)
CREAT SERPL-MCNC: 0.3 MG/DL (ref 0.5–1)
CRP SERPL-MCNC: 12.4 MG/DL (ref 0–0.9)
EOSINOPHIL NFR BLD AUTO: 0 % (ref 0–8)
ERYTHROCYTE [DISTWIDTH] IN BLOOD BY AUTOMATED COUNT: 15 % (ref 11.5–15.5)
HCT VFR BLD AUTO: 38.1 % (ref 37–47)
HGB BLD-MCNC: 12.1 G/DL (ref 12–16)
IMM GRANULOCYTES NFR BLD: 0.6 % (ref 0–5)
LYMPHOCYTES NFR BLD: 8 % (ref 15–41)
MCH RBC QN AUTO: 29.4 PG  CALC (ref 26–32)
MCHC RBC AUTO-ENTMCNC: 31.8 G/DL CAL (ref 32–36)
MCV RBC AUTO: 92.5 FL  CALC (ref 80–100)
MONOCYTES NFR BLD AUTO: 4 % (ref 2–13)
NEUTROPHILS # BLD AUTO: 7.28 THOU/UL (ref 2–7.15)
NEUTROPHILS NFR BLD AUTO: 88 % (ref 42–76)
PLATELET # BLD AUTO: 201 THOU/UL (ref 130–400)
POTASSIUM SERPL-SCNC: 3.6 MMOL/L (ref 3.5–5.1)
PROT SERPL-MCNC: 5.1 G/DL (ref 6.3–8.2)
RBC # BLD AUTO: 4.12 MILL/UL (ref 4.2–5.6)
SODIUM SERPL-SCNC: 142 MMOL/L (ref 137–146)

## 2021-08-19 NOTE — NUR
CONTINUOUS TUBE FEED PULMOCARE 1.5 ODALYS STARTED AT THIS TIME @ 30ML/HR VIA OG
TUBE. HOB ELEVATED AT 30 DEGREES. PT TOLERATING WELL.

## 2021-08-19 NOTE — NUR
TF STARTED BY PREVIOUS SHIFT, PULMOCARE FEEDING AT 30 ML/HR VIA FEEDING PUMP.
PLACEMENT OF OG TUBE CONFIRMED. NO RESIDUAL, TOLERATING FEEDING WELL THUS FAR.
VSS. REMAINS SEDATED AND INTUBATED. O2 SAT 96%

## 2021-08-19 NOTE — NUR
PT RESTING IN BED INTUBATED AND SEDATED AT THIS TIME. SHIFT ASSESSMENT
COMPLETED AT THIS TIME. IV PATENT X1. AWAKENING TRIAL STARTED SEE TITRATION
CHARTING. VSS ON MONITOR. WILL CONTINUE TO MONITOR CLOSELY.

## 2021-08-19 NOTE — NUR
DAUGHTER BARTOLO PHONED. TRANSFERRED PHONE TO CORDLESS PHONE INTO ROOM SO
PATIENT COULD HEAR DAUGHTERS VOICE. UPDATE PROVIDED.

## 2021-08-19 NOTE — NUR
NO CHANGES TO REPORT. REMAINS IN SEMI-FOWLERS POSITION. O2 SAT % REMAINS
NTUBATED AND SEDATED ON PROPOFOL GTT AT 50 MCG/KG/MIN AND VERSED AT 2 MG/HR.

## 2021-08-19 NOTE — NUR
PATIENT REMAINS INTUBATED AND SEDATED. VENT SETTINGS AS DOCUMENTED IN
INTERVENTIONS. O2 % PATIENT O2 SAT 96% BREATH SOUNDS DIMINISHED AND
COARSE. SUX ETT FOR SCANT TAN SECRETIONS. MOUTH CARE PROVIDED. LSCTLC IN
PLACE, DSG CDI. PROPOFOL AT 55 MCG/KG/MIN AND VERSED AT 2 MG/HR FOR SEDATION.
RASS -4. CARDIAC MONITOR SHOWS SR.

## 2021-08-20 VITALS — SYSTOLIC BLOOD PRESSURE: 107 MMHG | DIASTOLIC BLOOD PRESSURE: 75 MMHG

## 2021-08-20 VITALS — SYSTOLIC BLOOD PRESSURE: 144 MMHG | DIASTOLIC BLOOD PRESSURE: 79 MMHG

## 2021-08-20 VITALS — DIASTOLIC BLOOD PRESSURE: 67 MMHG | SYSTOLIC BLOOD PRESSURE: 110 MMHG

## 2021-08-20 VITALS — DIASTOLIC BLOOD PRESSURE: 78 MMHG | SYSTOLIC BLOOD PRESSURE: 127 MMHG

## 2021-08-20 VITALS — SYSTOLIC BLOOD PRESSURE: 105 MMHG | DIASTOLIC BLOOD PRESSURE: 67 MMHG

## 2021-08-20 VITALS — DIASTOLIC BLOOD PRESSURE: 83 MMHG | SYSTOLIC BLOOD PRESSURE: 135 MMHG

## 2021-08-20 VITALS — DIASTOLIC BLOOD PRESSURE: 93 MMHG | SYSTOLIC BLOOD PRESSURE: 149 MMHG

## 2021-08-20 VITALS — SYSTOLIC BLOOD PRESSURE: 114 MMHG | DIASTOLIC BLOOD PRESSURE: 72 MMHG

## 2021-08-20 VITALS — DIASTOLIC BLOOD PRESSURE: 73 MMHG | SYSTOLIC BLOOD PRESSURE: 114 MMHG

## 2021-08-20 VITALS — DIASTOLIC BLOOD PRESSURE: 70 MMHG | SYSTOLIC BLOOD PRESSURE: 115 MMHG

## 2021-08-20 VITALS — DIASTOLIC BLOOD PRESSURE: 71 MMHG | SYSTOLIC BLOOD PRESSURE: 101 MMHG

## 2021-08-20 VITALS — SYSTOLIC BLOOD PRESSURE: 127 MMHG | DIASTOLIC BLOOD PRESSURE: 75 MMHG

## 2021-08-20 VITALS — DIASTOLIC BLOOD PRESSURE: 82 MMHG | SYSTOLIC BLOOD PRESSURE: 135 MMHG

## 2021-08-20 VITALS — SYSTOLIC BLOOD PRESSURE: 130 MMHG | DIASTOLIC BLOOD PRESSURE: 90 MMHG

## 2021-08-20 VITALS — DIASTOLIC BLOOD PRESSURE: 68 MMHG | SYSTOLIC BLOOD PRESSURE: 105 MMHG

## 2021-08-20 VITALS — DIASTOLIC BLOOD PRESSURE: 69 MMHG | SYSTOLIC BLOOD PRESSURE: 108 MMHG

## 2021-08-20 VITALS — SYSTOLIC BLOOD PRESSURE: 158 MMHG | DIASTOLIC BLOOD PRESSURE: 96 MMHG

## 2021-08-20 VITALS — SYSTOLIC BLOOD PRESSURE: 131 MMHG | DIASTOLIC BLOOD PRESSURE: 88 MMHG

## 2021-08-20 VITALS — SYSTOLIC BLOOD PRESSURE: 100 MMHG | DIASTOLIC BLOOD PRESSURE: 66 MMHG

## 2021-08-20 VITALS — DIASTOLIC BLOOD PRESSURE: 63 MMHG | SYSTOLIC BLOOD PRESSURE: 105 MMHG

## 2021-08-20 VITALS — SYSTOLIC BLOOD PRESSURE: 115 MMHG | DIASTOLIC BLOOD PRESSURE: 70 MMHG

## 2021-08-20 VITALS — SYSTOLIC BLOOD PRESSURE: 111 MMHG | DIASTOLIC BLOOD PRESSURE: 64 MMHG

## 2021-08-20 VITALS — SYSTOLIC BLOOD PRESSURE: 106 MMHG | DIASTOLIC BLOOD PRESSURE: 65 MMHG

## 2021-08-20 LAB
ALBUMIN SERPL-MCNC: 2.4 G/DL (ref 3.2–5)
ALP SERPL-CCNC: 132 U/L (ref 38–126)
ANION GAP SERPL CALCULATED.3IONS-SCNC: 8 MMOL/L
AST SERPL-CCNC: 44 U/L (ref 14–36)
BASOPHILS NFR BLD AUTO: 0 % (ref 0–3)
BUN SERPL-MCNC: 25 MG/DL (ref 7–17)
BUN/CREAT SERPL: 32
CHLORIDE SERPL-SCNC: 108 MMOL/L (ref 95–108)
CO2 SERPL-SCNC: 30 MMOL/L (ref 22–30)
CREAT SERPL-MCNC: 0.8 MG/DL (ref 0.5–1)
CRP SERPL-MCNC: 12.5 MG/DL (ref 0–0.9)
EOSINOPHIL NFR BLD AUTO: 0 % (ref 0–8)
ERYTHROCYTE [DISTWIDTH] IN BLOOD BY AUTOMATED COUNT: 15.1 % (ref 11.5–15.5)
HCT VFR BLD AUTO: 38.6 % (ref 37–47)
HGB BLD-MCNC: 12.5 G/DL (ref 12–16)
IMM GRANULOCYTES NFR BLD: 0.8 % (ref 0–5)
LYMPHOCYTES NFR BLD: 11 % (ref 15–41)
MCH RBC QN AUTO: 29.6 PG  CALC (ref 26–32)
MCHC RBC AUTO-ENTMCNC: 32.4 G/DL CAL (ref 32–36)
MCV RBC AUTO: 91.5 FL  CALC (ref 80–100)
MONOCYTES NFR BLD AUTO: 5 % (ref 2–13)
NEUTROPHILS # BLD AUTO: 8.75 THOU/UL (ref 2–7.15)
NEUTROPHILS NFR BLD AUTO: 83 % (ref 42–76)
PLATELET # BLD AUTO: 263 THOU/UL (ref 130–400)
POTASSIUM SERPL-SCNC: 3.1 MMOL/L (ref 3.5–5.1)
PROT SERPL-MCNC: 5.3 G/DL (ref 6.3–8.2)
RBC # BLD AUTO: 4.22 MILL/UL (ref 4.2–5.6)
SODIUM SERPL-SCNC: 143 MMOL/L (ref 137–146)

## 2021-08-20 NOTE — NUR
COMPLETE BED BATH GIVEN. AND LINENS CHANGED. PATIENT TURNED TO PRONE
POSITION. WITH ASSIST OF 4 NURSES AND RT. DARK PINK URINE NOTED TO BE LEAKING
AROUND STEPHEN CATHETER. STEPHEN DC'D. NEW STEPHEN #16 PLACED WITHOUT DIFFICULTY
DRAINING IMMEDIATELY A LARGE AMOUNT OF BLOODY URINE.

## 2021-08-20 NOTE — NUR
RESTING PRONE. VSS. O2 SAT 93-98%  REMAINS ON VENT. SEDATED ON PROPOFOL AND
VERSED GTTS. RASS -4. SR ON MONITOR WITH HR IN THE 70'S.

## 2021-08-20 NOTE — NUR
remains intubated and sedated; remains proned; iv intact and patent; versed
and propofol continues; wise to gravity; will continue to monitor

## 2021-08-20 NOTE — NUR
remains intubated and sedated; resp unlabored; proned positioned; o2 sat 100%;
sr on monitor; will continue to monitor

## 2021-08-20 NOTE — NUR
pt remains intubated; versed and propofol remains on hold; pt withdraws to
painful stimuli; pt has not awakened; prone positioned; wise to gravity; iv
intact and patent; vent maintained; will continue to monitor

## 2021-08-20 NOTE — NUR
resting in bed with eyes closed; intubated and sedated; sedation remains off
at this time pending awakening trial; wise to gravity; iv intact; proned; sr
on monitor; will continue to monitor

## 2021-08-20 NOTE — NUR
versed and propofol remains on hold; pt has not awakened yet; pt will withdraw
to pain; will continue to monitor

## 2021-08-20 NOTE — NUR
sedation titrated at this time for awakening trial; versed on hold; propofol
titrated; daughter Carolynn called from portable phone; allowed to speak over
speaker phone to mother; writer remains at bedside; restraints reinforced to
prevent self extubation; proned; will continue to monitor

## 2021-08-20 NOTE — NUR
pt in prone position. nad. vss. will titrate parameters when pt is placed
supine hsortly. rrt to monitor.

## 2021-08-20 NOTE — NUR
proned; no apparent distress noted; resp even and unlabored; vent intact and
maintained; wise to gravity; will continue to monitor

## 2021-08-20 NOTE — NUR
pt noted with eyes open; pt does not follow verbal commands at this time; hr
90s; wise to gravity; repositioned; air mattress; sedation resumed; versed at
2mg/hr; propofol at 30mcg/kg/min

## 2021-08-20 NOTE — NUR
PATIENT RR UP INTO THE 40'S. O2 SAT DROPPED TO 80'S. CALL TO STEPHANIE AMIN TO
EVALUATE.  NGT RESIDUAL CHECK SHOWS 90 ML. TUBE FEEDING ON HOLD.

## 2021-08-20 NOTE — NUR
pt repositioned to supine position x4 staff; tolerated well; eyes opened
briefly; monitoring attachments applied; sedation remains on hold; sr on
monitor; wise to gravity; will continue to monitor

## 2021-08-20 NOTE — NUR
pt intubated and sedated; no apparent distress noted; assessment completed at
this time; pt sedated; no apparent s/sx of pain noted; no facial grimaces
noted; resp even and unlabored; lungs clear/ diminished bases; skin color wnl;
vent intact and maintained with settings of AC mode, rate 28, , peep
12.0, FiO2 of 70%; o2 sat %l pt is proned; hr reg; strong pulses;
generalized edema noted; sr on monitor; bilat scds intact; abd soft with bs
present; no bm noted per writer; wise to gravity draining blood tinged urine;
cath strap intact; TLC noted to left subclavian with propofol gtt infusing at
45mcg/kg/min and versed at 2mg/hr; no redness or edema noted at site; air
mattress intact; restraints continued; og intact/ placement confirmed with air
insert/ascult; og to lis; will continue to monitor

## 2021-08-20 NOTE — NUR
PATIENT REMAINS INTUBATED AND SEDATED. VENT SETTINGS AS DOCUMENTED IN
INTERVENTIONS. O2 SAT 98% BREATH SOUNDS DIMINISHED. SUX ETT AND ORALLY FOR
SCANT SECRETIONS. OGT TO LIS DRAINING GASTRIC CONTENTS. STEPHEN DRAINS BLOOD
TINGED URINE WITH RED SEDIMENT. GENERALIZED EDEMA PRESENT. PATIENT IS ON AN
AIR MATTRESS. BILATERAL SOFT WRIST RESTRAINTS IN PLACE. LSCTLC IN PLACE, DSG
CDI. PROPOFOL INFUSING AT 30 MCG/KG/MIN AND VERSED GTT AT 2 MG/HR. PATIENT
ADEQUATELY SEDATED-RASS -4. CARDIAC MONITOR SHOWS SR.

## 2021-08-21 VITALS — SYSTOLIC BLOOD PRESSURE: 129 MMHG | DIASTOLIC BLOOD PRESSURE: 71 MMHG

## 2021-08-21 VITALS — SYSTOLIC BLOOD PRESSURE: 132 MMHG | DIASTOLIC BLOOD PRESSURE: 82 MMHG

## 2021-08-21 VITALS — DIASTOLIC BLOOD PRESSURE: 67 MMHG | SYSTOLIC BLOOD PRESSURE: 109 MMHG

## 2021-08-21 VITALS — SYSTOLIC BLOOD PRESSURE: 102 MMHG | DIASTOLIC BLOOD PRESSURE: 64 MMHG

## 2021-08-21 VITALS — SYSTOLIC BLOOD PRESSURE: 90 MMHG | DIASTOLIC BLOOD PRESSURE: 80 MMHG

## 2021-08-21 VITALS — SYSTOLIC BLOOD PRESSURE: 103 MMHG | DIASTOLIC BLOOD PRESSURE: 70 MMHG

## 2021-08-21 VITALS — SYSTOLIC BLOOD PRESSURE: 109 MMHG | DIASTOLIC BLOOD PRESSURE: 75 MMHG

## 2021-08-21 VITALS — DIASTOLIC BLOOD PRESSURE: 80 MMHG | SYSTOLIC BLOOD PRESSURE: 120 MMHG

## 2021-08-21 VITALS — DIASTOLIC BLOOD PRESSURE: 70 MMHG | SYSTOLIC BLOOD PRESSURE: 127 MMHG

## 2021-08-21 VITALS — DIASTOLIC BLOOD PRESSURE: 87 MMHG | SYSTOLIC BLOOD PRESSURE: 140 MMHG

## 2021-08-21 VITALS — SYSTOLIC BLOOD PRESSURE: 118 MMHG | DIASTOLIC BLOOD PRESSURE: 78 MMHG

## 2021-08-21 VITALS — SYSTOLIC BLOOD PRESSURE: 106 MMHG | DIASTOLIC BLOOD PRESSURE: 65 MMHG

## 2021-08-21 VITALS — DIASTOLIC BLOOD PRESSURE: 71 MMHG | SYSTOLIC BLOOD PRESSURE: 106 MMHG

## 2021-08-21 VITALS — DIASTOLIC BLOOD PRESSURE: 77 MMHG | SYSTOLIC BLOOD PRESSURE: 123 MMHG

## 2021-08-21 VITALS — SYSTOLIC BLOOD PRESSURE: 107 MMHG | DIASTOLIC BLOOD PRESSURE: 83 MMHG

## 2021-08-21 VITALS — SYSTOLIC BLOOD PRESSURE: 124 MMHG | DIASTOLIC BLOOD PRESSURE: 86 MMHG

## 2021-08-21 VITALS — DIASTOLIC BLOOD PRESSURE: 61 MMHG | SYSTOLIC BLOOD PRESSURE: 112 MMHG

## 2021-08-21 VITALS — SYSTOLIC BLOOD PRESSURE: 112 MMHG | DIASTOLIC BLOOD PRESSURE: 77 MMHG

## 2021-08-21 VITALS — DIASTOLIC BLOOD PRESSURE: 78 MMHG | SYSTOLIC BLOOD PRESSURE: 118 MMHG

## 2021-08-21 VITALS — SYSTOLIC BLOOD PRESSURE: 113 MMHG | DIASTOLIC BLOOD PRESSURE: 76 MMHG

## 2021-08-21 VITALS — DIASTOLIC BLOOD PRESSURE: 90 MMHG | SYSTOLIC BLOOD PRESSURE: 131 MMHG

## 2021-08-21 VITALS — DIASTOLIC BLOOD PRESSURE: 70 MMHG | SYSTOLIC BLOOD PRESSURE: 108 MMHG

## 2021-08-21 VITALS — SYSTOLIC BLOOD PRESSURE: 132 MMHG | DIASTOLIC BLOOD PRESSURE: 83 MMHG

## 2021-08-21 LAB
ALBUMIN SERPL-MCNC: 2.4 G/DL (ref 3.2–5)
ALP SERPL-CCNC: 125 U/L (ref 38–126)
ANION GAP SERPL CALCULATED.3IONS-SCNC: 7 MMOL/L
AST SERPL-CCNC: 39 U/L (ref 14–36)
BASOPHILS NFR BLD AUTO: 0 % (ref 0–3)
BUN SERPL-MCNC: 25 MG/DL (ref 7–17)
BUN/CREAT SERPL: 64
CHLORIDE SERPL-SCNC: 107 MMOL/L (ref 95–108)
CO2 SERPL-SCNC: 35 MMOL/L (ref 22–30)
CREAT SERPL-MCNC: 0.4 MG/DL (ref 0.5–1)
CRP SERPL-MCNC: 7.6 MG/DL (ref 0–0.9)
EOSINOPHIL NFR BLD AUTO: 1 % (ref 0–8)
ERYTHROCYTE [DISTWIDTH] IN BLOOD BY AUTOMATED COUNT: 15.5 % (ref 11.5–15.5)
HCT VFR BLD AUTO: 40.2 % (ref 37–47)
HGB BLD-MCNC: 13.1 G/DL (ref 12–16)
IMM GRANULOCYTES NFR BLD: 0.9 % (ref 0–5)
LYMPHOCYTES NFR BLD: 16 % (ref 15–41)
MCH RBC QN AUTO: 30 PG  CALC (ref 26–32)
MCHC RBC AUTO-ENTMCNC: 32.6 G/DL CAL (ref 32–36)
MCV RBC AUTO: 92.2 FL  CALC (ref 80–100)
MONOCYTES NFR BLD AUTO: 5 % (ref 2–13)
NEUTROPHILS # BLD AUTO: 6.25 THOU/UL (ref 2–7.15)
NEUTROPHILS NFR BLD AUTO: 77 % (ref 42–76)
PLATELET # BLD AUTO: 281 THOU/UL (ref 130–400)
POTASSIUM SERPL-SCNC: 3.2 MMOL/L (ref 3.5–5.1)
PROT SERPL-MCNC: 5.3 G/DL (ref 6.3–8.2)
RBC # BLD AUTO: 4.36 MILL/UL (ref 4.2–5.6)
SODIUM SERPL-SCNC: 146 MMOL/L (ref 137–146)

## 2021-08-21 NOTE — NUR
ACCU CHECK 85. NO COVERAGE REQUIRED. VSS. REMAINS SEDATED. VENT SETTINGS
UNCHANGED FROM EARLIER ASSESSMENT. O2 SATS UPPER 90'S. DIURESING WELL AFTER
LASIX GIVEN EARLIER.

## 2021-08-21 NOTE — NUR
pt noted with eyes open; does not follow commands; iv intact; sedation on
hold; wise to gravity; repositioned to high fowlers; air mattress; vent
intact and maintained; call light within reach; will continue to monitor

## 2021-08-21 NOTE — NUR
intubated and sedated; sedation on hold for awakening trial; restraints
intact; wise to gravity; sr on monitor; will continue to monitor

## 2021-08-21 NOTE — NUR
Dr Atkins present at bedside to assess pt; pt noted with eyes open; eyes
closed per Dr Atkins; pt able to open eyes per Dr Atkins command; does not
follow commands such as squeezing hands and such; will continue to monitor

## 2021-08-21 NOTE — NUR
remains intubated and sedated; repositioned; oral care; wise to gravity;
restraints; sr on monitor; air mattress; will continue to monitor

## 2021-08-21 NOTE — NUR
NO CHANGGES TO REPORT AT THIS TIME. REMAINS SEDATED AND INTUBATED. PROPOFOL
CONTINUES AT 30 MCG/KG/MIN AND VERSED AT 2 MG/HR. SR ON MONITOR.

## 2021-08-21 NOTE — NUR
PATIENT REMAINS INTUBATED AND SEDATED. VENT SETTING AS DOCUMENT IN
INTERVENTIONS. O2 SAT 92% ON FIO2 60% BREATH SOUNDS DIMINISHED ESSENTIALLY
CLEAR. OGT TO LIS DRAINS GREEN. STEPHEN DRAINING MARIO ALBERTO URINE. GENRALIZED EDEMA
PRESENT. LSCTLC IN PLACE, DSG CDI. PROPOFOL GTT AND VERSED GTT INFUSING FOR
SEDATIN WITH RASS -4. SCD'S ON BIALTERALLY. PATIENT IS ON AN AIR MATTRESS.
CARDIAC MONITOR SHOWS SR, HR 90'S. SHIFT ASSESSMENT COMPLTED.

## 2021-08-21 NOTE — NUR
CONTINUES ON VENT. SEDATION ON PROPOFOL AND VERSED, RASS -4 TO -5. VSS. O2 SAT
% VSS. MONITOR SB-SR.

## 2021-08-21 NOTE — NUR
tube feed re-initiated; pulmocare at 30cc/hr; goal of 50cc/hr; accucheck was
66; Dr Atkins was made aware and agreeable with tube feeds being restarted;

## 2021-08-21 NOTE — NUR
pt moving head from side to side; open eyes occasionally; ETT and orally
suction; st 110s on the monitor; wise to gravity; sedation resumed versed at
1mg/hr and propofol at 30mcg/kg/min; air matress; will continue to monitor

## 2021-08-21 NOTE — NUR
intubated; no apparent distress noted; vent intact and maintained; sr on
monitor; wise to gravity; repositioned; sedation remains on hold; pt
continues to not follow verbal commands; pt is asble to open and close eyes;
will continue to monitor

## 2021-08-21 NOTE — NUR
pt intubated and sedated; no apparent distress noted; assessment completed at
this time; pt intubated; no s/sx of pain/ facial grimaces noted; no n/v noted;
resp even and unlabored; lungs clear/ diminished bases; skin color wnl; vent
intact and maintained with settings of AC mode, rate 28, , peep 12.0,
FiO2 of 70%; hr reg; strong pulses; generalized edema noted; sr on monitor;
abd soft with bs present; no bm noted per writer; og intact to lis with
greenish/yellow gastric content noted; palcement confirmed via air insertion/
ascult; wise to gravity draining well; cath colvin intact; TLC patent to left
subclav with versed at 2mg/hr and propofol at 30mcg/kg/min; no redness or
edema noted at site; abrasion noted to inner right heel; air mattress;
restraints intact and reinforced; will continue to monitor

## 2021-08-22 VITALS — DIASTOLIC BLOOD PRESSURE: 81 MMHG | SYSTOLIC BLOOD PRESSURE: 110 MMHG

## 2021-08-22 VITALS — SYSTOLIC BLOOD PRESSURE: 112 MMHG | DIASTOLIC BLOOD PRESSURE: 73 MMHG

## 2021-08-22 VITALS — DIASTOLIC BLOOD PRESSURE: 78 MMHG | SYSTOLIC BLOOD PRESSURE: 127 MMHG

## 2021-08-22 VITALS — DIASTOLIC BLOOD PRESSURE: 75 MMHG | SYSTOLIC BLOOD PRESSURE: 116 MMHG

## 2021-08-22 VITALS — DIASTOLIC BLOOD PRESSURE: 80 MMHG | SYSTOLIC BLOOD PRESSURE: 114 MMHG

## 2021-08-22 VITALS — SYSTOLIC BLOOD PRESSURE: 119 MMHG | DIASTOLIC BLOOD PRESSURE: 71 MMHG

## 2021-08-22 VITALS — SYSTOLIC BLOOD PRESSURE: 118 MMHG | DIASTOLIC BLOOD PRESSURE: 78 MMHG

## 2021-08-22 VITALS — DIASTOLIC BLOOD PRESSURE: 84 MMHG | SYSTOLIC BLOOD PRESSURE: 139 MMHG

## 2021-08-22 VITALS — SYSTOLIC BLOOD PRESSURE: 131 MMHG | DIASTOLIC BLOOD PRESSURE: 75 MMHG

## 2021-08-22 VITALS — SYSTOLIC BLOOD PRESSURE: 114 MMHG | DIASTOLIC BLOOD PRESSURE: 76 MMHG

## 2021-08-22 VITALS — SYSTOLIC BLOOD PRESSURE: 117 MMHG | DIASTOLIC BLOOD PRESSURE: 72 MMHG

## 2021-08-22 VITALS — SYSTOLIC BLOOD PRESSURE: 94 MMHG | DIASTOLIC BLOOD PRESSURE: 67 MMHG

## 2021-08-22 VITALS — DIASTOLIC BLOOD PRESSURE: 70 MMHG | SYSTOLIC BLOOD PRESSURE: 108 MMHG

## 2021-08-22 VITALS — SYSTOLIC BLOOD PRESSURE: 110 MMHG | DIASTOLIC BLOOD PRESSURE: 76 MMHG

## 2021-08-22 VITALS — DIASTOLIC BLOOD PRESSURE: 76 MMHG | SYSTOLIC BLOOD PRESSURE: 116 MMHG

## 2021-08-22 VITALS — SYSTOLIC BLOOD PRESSURE: 109 MMHG | DIASTOLIC BLOOD PRESSURE: 73 MMHG

## 2021-08-22 VITALS — SYSTOLIC BLOOD PRESSURE: 103 MMHG | DIASTOLIC BLOOD PRESSURE: 78 MMHG

## 2021-08-22 VITALS — DIASTOLIC BLOOD PRESSURE: 67 MMHG | SYSTOLIC BLOOD PRESSURE: 100 MMHG

## 2021-08-22 VITALS — SYSTOLIC BLOOD PRESSURE: 127 MMHG | DIASTOLIC BLOOD PRESSURE: 81 MMHG

## 2021-08-22 VITALS — DIASTOLIC BLOOD PRESSURE: 76 MMHG | SYSTOLIC BLOOD PRESSURE: 127 MMHG

## 2021-08-22 VITALS — SYSTOLIC BLOOD PRESSURE: 131 MMHG | DIASTOLIC BLOOD PRESSURE: 78 MMHG

## 2021-08-22 VITALS — SYSTOLIC BLOOD PRESSURE: 112 MMHG | DIASTOLIC BLOOD PRESSURE: 76 MMHG

## 2021-08-22 VITALS — SYSTOLIC BLOOD PRESSURE: 100 MMHG | DIASTOLIC BLOOD PRESSURE: 74 MMHG

## 2021-08-22 LAB
ALBUMIN SERPL-MCNC: 2.6 G/DL (ref 3.2–5)
ALP SERPL-CCNC: 198 U/L (ref 38–126)
ANION GAP SERPL CALCULATED.3IONS-SCNC: 6 MMOL/L
AST SERPL-CCNC: 52 U/L (ref 14–36)
BASOPHILS NFR BLD AUTO: 0 % (ref 0–3)
BUN SERPL-MCNC: 30 MG/DL (ref 7–17)
BUN/CREAT SERPL: 72
CHLORIDE SERPL-SCNC: 104 MMOL/L (ref 95–108)
CO2 SERPL-SCNC: 39 MMOL/L (ref 22–30)
CREAT SERPL-MCNC: 0.4 MG/DL (ref 0.5–1)
CRP SERPL-MCNC: 5.7 MG/DL (ref 0–0.9)
EOSINOPHIL NFR BLD AUTO: 1 % (ref 0–8)
ERYTHROCYTE [DISTWIDTH] IN BLOOD BY AUTOMATED COUNT: 15.5 % (ref 11.5–15.5)
HCT VFR BLD AUTO: 41.2 % (ref 37–47)
HGB BLD-MCNC: 13.1 G/DL (ref 12–16)
IMM GRANULOCYTES NFR BLD: 0.6 % (ref 0–5)
LYMPHOCYTES NFR BLD: 18 % (ref 15–41)
MCH RBC QN AUTO: 29.4 PG  CALC (ref 26–32)
MCHC RBC AUTO-ENTMCNC: 31.8 G/DL CAL (ref 32–36)
MCV RBC AUTO: 92.6 FL  CALC (ref 80–100)
MONOCYTES NFR BLD AUTO: 7 % (ref 2–13)
NEUTROPHILS # BLD AUTO: 7.68 THOU/UL (ref 2–7.15)
NEUTROPHILS NFR BLD AUTO: 73 % (ref 42–76)
PLATELET # BLD AUTO: 311 THOU/UL (ref 130–400)
POTASSIUM SERPL-SCNC: 2.9 MMOL/L (ref 3.5–5.1)
PROT SERPL-MCNC: 5.6 G/DL (ref 6.3–8.2)
RBC # BLD AUTO: 4.45 MILL/UL (ref 4.2–5.6)
SODIUM SERPL-SCNC: 146 MMOL/L (ref 137–146)

## 2021-08-22 NOTE — NUR
pt repositioned to supine x4 staff; tolerated well; iv intact; no redness or
edema noted at site; sr on monitor; propofol gtt placed on hold for awakening
trial; wise to gravity; vent intact and maintained; will continue to monitor

## 2021-08-22 NOTE — NUR
pt intubated and sedated; no apparent distress noted; assessment completed at
this time; no signs of pain noted; no n/v noted; resp even and unlabored;
lungs clear/ diminished bases; skin color wnl; vent intact and maintained with
settings of AC mode, rate 28, , peep 8.0, FiO2 of 60%; hr reg; weak
pedal pulses; left foot cool to touch; sr on monitor; gernalized edema noted
to ble; abd soft; bs hypoactive; no bm noted per writer; og tube intact with
greenish/yellow gastric content noted; placement verified via air insert/
auscult; wise to gravity draining sediment tea colored urine; TLC patent to
left subclav with propofol infusing at 30mcg/kg/min; versed at 1mg/hr; no
redness or edema noted at site; scs intact; air mattress intact; pt proned;
will continue to monitor

## 2021-08-22 NOTE — NUR
CONFERENCED WITH SHADY KAYE RRT REGARDING PT'S RESPIRATORY STATUS AND PLAN OF
CARE. PT'S SP02 REMAINS 97%. NO CHANGES TO VENT SETTINGS. WILL CONTINUE
COLLABARATIVE MONITORING OF PT'S RESPIRATORY STATUS.

## 2021-08-22 NOTE — NUR
REMAINS INTUBATED AND SEDATED. PROPOFOL CONTINUES AT 30 MCG/KG/MIN AND VERSED
AT 1 MG/HR. RASS -4. O2 SAT 95% SR ON MONITOR. CONTINUES RESTING IN PRONE
POSITION.

## 2021-08-22 NOTE — NUR
intubated and sedated; propofol and versed continues; vent intact and
maintained; iv patent; wise to gravity; air mattress; sr on monitor;

## 2021-08-22 NOTE — NUR
PT SEMIFOWLERS ON AIR MATTRESS. SEDETAED. B/L SOFT WRIST RESTRAINTS IN PLACE.
SCDS ON. INTUBATED 8.0 ET TAPED 23CM AT THE LIP. A/C ON VENT WITH FIO2 60%
RATE 28, PEEP 8, . SP02 97%. LUNGS SOUNDS CLEAR AND DIMINISHED. OG TUBE
IN PLACE TO LIS. L-SUBCLAVIAN TLC PATENT, INFUSING 1/2NS W/ 20KCL @ 100,
PROPOFOL AT 30MCG/KG/MIN, VERSED AT 10MG/H. POSITIVE FOR BLOOD RETURN. SR80S
ON MONITOR. NIBP 127/81mmHg. TEMP 97.8. ET SUCTION PERFORMED. NO SECRETIONS.
POSITIVE GAG REFLEX. ORAL CARE PERFORMED. STEPHEN SECURED TO LEG WITH TUBING
UNKINKED AND UNOBSTRUCTED, DRAINING YELLOW URINE. COVID-19 ISOLATION
PRECAUTIONS MAINTAINED. BED LOCKED IN LOW POSITION WITH BED RAILS UP X2.

## 2021-08-22 NOTE — NUR
remains proned; oral care; tongue coated/white; wise to gravity; sr on
monitor; vent intact and maintained; wise to gravity; will continue to
monitor

## 2021-08-22 NOTE — NUR
intubated; iv intact and patent; wise to gravity; repositioned; vent intact
and maintained; sedation remains on hold; will continue to monitor

## 2021-08-22 NOTE — NUR
remains intubated and sedated; restraints reinforced; propofol titrated to
20mcg/kg/min; wise to gravity; remains proned; will continue to monitor

## 2021-08-22 NOTE — NUR
intubated and sedated; no apparent distress noted; proned; extremities
repositioned; iv intact and patent; sr on monitor; wise to gravity; air
mattress; will continue to monitor

## 2021-08-23 VITALS — DIASTOLIC BLOOD PRESSURE: 76 MMHG | SYSTOLIC BLOOD PRESSURE: 120 MMHG

## 2021-08-23 VITALS — SYSTOLIC BLOOD PRESSURE: 112 MMHG | DIASTOLIC BLOOD PRESSURE: 81 MMHG

## 2021-08-23 VITALS — DIASTOLIC BLOOD PRESSURE: 72 MMHG | SYSTOLIC BLOOD PRESSURE: 114 MMHG

## 2021-08-23 VITALS — DIASTOLIC BLOOD PRESSURE: 65 MMHG | SYSTOLIC BLOOD PRESSURE: 97 MMHG

## 2021-08-23 VITALS — DIASTOLIC BLOOD PRESSURE: 74 MMHG | SYSTOLIC BLOOD PRESSURE: 125 MMHG

## 2021-08-23 VITALS — DIASTOLIC BLOOD PRESSURE: 75 MMHG | SYSTOLIC BLOOD PRESSURE: 126 MMHG

## 2021-08-23 VITALS — DIASTOLIC BLOOD PRESSURE: 71 MMHG | SYSTOLIC BLOOD PRESSURE: 123 MMHG

## 2021-08-23 VITALS — DIASTOLIC BLOOD PRESSURE: 72 MMHG | SYSTOLIC BLOOD PRESSURE: 119 MMHG

## 2021-08-23 VITALS — SYSTOLIC BLOOD PRESSURE: 121 MMHG | DIASTOLIC BLOOD PRESSURE: 79 MMHG

## 2021-08-23 VITALS — SYSTOLIC BLOOD PRESSURE: 106 MMHG | DIASTOLIC BLOOD PRESSURE: 61 MMHG

## 2021-08-23 VITALS — DIASTOLIC BLOOD PRESSURE: 71 MMHG | SYSTOLIC BLOOD PRESSURE: 110 MMHG

## 2021-08-23 VITALS — DIASTOLIC BLOOD PRESSURE: 62 MMHG | SYSTOLIC BLOOD PRESSURE: 91 MMHG

## 2021-08-23 VITALS — SYSTOLIC BLOOD PRESSURE: 123 MMHG | DIASTOLIC BLOOD PRESSURE: 71 MMHG

## 2021-08-23 VITALS — SYSTOLIC BLOOD PRESSURE: 105 MMHG | DIASTOLIC BLOOD PRESSURE: 66 MMHG

## 2021-08-23 VITALS — SYSTOLIC BLOOD PRESSURE: 121 MMHG | DIASTOLIC BLOOD PRESSURE: 71 MMHG

## 2021-08-23 VITALS — DIASTOLIC BLOOD PRESSURE: 62 MMHG | SYSTOLIC BLOOD PRESSURE: 88 MMHG

## 2021-08-23 VITALS — SYSTOLIC BLOOD PRESSURE: 94 MMHG | DIASTOLIC BLOOD PRESSURE: 63 MMHG

## 2021-08-23 VITALS — SYSTOLIC BLOOD PRESSURE: 113 MMHG | DIASTOLIC BLOOD PRESSURE: 71 MMHG

## 2021-08-23 VITALS — SYSTOLIC BLOOD PRESSURE: 98 MMHG | DIASTOLIC BLOOD PRESSURE: 69 MMHG

## 2021-08-23 VITALS — SYSTOLIC BLOOD PRESSURE: 102 MMHG | DIASTOLIC BLOOD PRESSURE: 75 MMHG

## 2021-08-23 VITALS — SYSTOLIC BLOOD PRESSURE: 106 MMHG | DIASTOLIC BLOOD PRESSURE: 62 MMHG

## 2021-08-23 VITALS — SYSTOLIC BLOOD PRESSURE: 90 MMHG | DIASTOLIC BLOOD PRESSURE: 59 MMHG

## 2021-08-23 VITALS — SYSTOLIC BLOOD PRESSURE: 91 MMHG | DIASTOLIC BLOOD PRESSURE: 58 MMHG

## 2021-08-23 VITALS — DIASTOLIC BLOOD PRESSURE: 63 MMHG | SYSTOLIC BLOOD PRESSURE: 104 MMHG

## 2021-08-23 LAB
ALBUMIN SERPL-MCNC: 2.5 G/DL (ref 3.2–5)
ALP SERPL-CCNC: 196 U/L (ref 38–126)
ANION GAP SERPL CALCULATED.3IONS-SCNC: 6 MMOL/L
AST SERPL-CCNC: 47 U/L (ref 14–36)
BASOPHILS NFR BLD AUTO: 0 % (ref 0–3)
BUN SERPL-MCNC: 31 MG/DL (ref 7–17)
BUN/CREAT SERPL: 85
CHLORIDE SERPL-SCNC: 106 MMOL/L (ref 95–108)
CO2 SERPL-SCNC: 37 MMOL/L (ref 22–30)
CREAT SERPL-MCNC: 0.4 MG/DL (ref 0.5–1)
EOSINOPHIL NFR BLD AUTO: 2 % (ref 0–8)
ERYTHROCYTE [DISTWIDTH] IN BLOOD BY AUTOMATED COUNT: 15.5 % (ref 11.5–15.5)
HCT VFR BLD AUTO: 38.6 % (ref 37–47)
HGB BLD-MCNC: 12.2 G/DL (ref 12–16)
IMM GRANULOCYTES NFR BLD: 0.7 % (ref 0–5)
LYMPHOCYTES NFR BLD: 24 % (ref 15–41)
MCH RBC QN AUTO: 29.5 PG  CALC (ref 26–32)
MCHC RBC AUTO-ENTMCNC: 31.6 G/DL CAL (ref 32–36)
MCV RBC AUTO: 93.5 FL  CALC (ref 80–100)
MONOCYTES NFR BLD AUTO: 8 % (ref 2–13)
NEUTROPHILS # BLD AUTO: 4.68 THOU/UL (ref 2–7.15)
NEUTROPHILS NFR BLD AUTO: 65 % (ref 42–76)
PLATELET # BLD AUTO: 263 THOU/UL (ref 130–400)
POTASSIUM SERPL-SCNC: 3.2 MMOL/L (ref 3.5–5.1)
PROT SERPL-MCNC: 5.4 G/DL (ref 6.3–8.2)
RBC # BLD AUTO: 4.13 MILL/UL (ref 4.2–5.6)
SODIUM SERPL-SCNC: 146 MMOL/L (ref 137–146)

## 2021-08-23 NOTE — NUR
PT SEMIFOWLERS ON AIR MATTRESS. SEDETAED. B/L SOFT WRIST RESTRAINTS IN PLACE.
SCDS ON. INTUBATED 8.0 ET TAPED 23CM AT THE LIP. A/C ON VENT WITH FIO2 60%
RATE 28, PEEP 8, . SP02 96%. LUNGS SOUNDS CLEAR AND DIMINISHED. OG TUBE
IN PLACE TO LIS. L-SUBCLAVIAN TLC PATENT, INFUSING PROPOFOL AT 30MCG/KG/MIN,
VERSED AT 10MG/H. POSITIVE FOR BLOOD RETURN. . ON MONITOR. NIBP
112/81mmHg. TEMP 97.4. ET SUCTION PERFORMED. NO SECRETIONS. POSITIVE GAG
REFLEX. ORAL CARE PERFORMED. STEPHEN SECURED TO LEG WITH TUBING UNKINKED AND
UNOBSTRUCTED, DRAINING MARIO ALBERTO URINE. COVID-19 ISOLATION PRECAUTIONS
MAINTAINED. BED LOCKED IN LOW POSITION WITH BED RAILS UP X2.

## 2021-08-23 NOTE — NUR
PATIENT RESTING IN BED INTUBATED AND SEDATED AT THIS TIME. VSS ON MONITOR.
SHIFT ASSESSMENT COMPLETED AT THIS TIME. IV PATENT X1. WILL CONTINUE TO
MONITOR.

## 2021-08-23 NOTE — NUR
WEANING TRIAL UNSUCCESSFUL WITH RT AT THIS TIME. RESP RATE IN THE 60S. TIDAL
VOLUME NOT GREATER THAN 170. PATIENT RESEDATED AT THIS TIME. DR ROSADO
NOTIFIED.

## 2021-08-23 NOTE — NUR
PATIENT RESTING IN BED INTUBATED AND SEDATED AT THIS TIME. VSS ON MONITOR.
WILL CONTINUE TO MONITOR.

## 2021-08-23 NOTE — NUR
PER PHYSICIAN REQUEST WEANING TRIAL PERFORMED BY RT. SEDATION WAS STOPPED AND
PATIENT WAS ALLOWED TIME TO WAKE FOR TRIAL. PATIENT DID TURN HEAD TOWARD
THERAPIST WHEN ASKED, BUT WOULD NOT SQUEEZE HAND ON COMMAND. DURING TRIAL
MULTIPLE ATTEMPTS WERE MADE TO ASK THE PATIENT TO SLOW THEIR BREATHING DOWN
AND TAKE DEEPER BREATHES. VT AVERAGED 175ML AT A RATE OF 60BPM REGARDLESS OF
COMMANDS TO TAKE SLOWER DEEPER BREATHES. VITAL SIGNS REMAINED WITHIN NORMAL
LIMITS THROUGHOUT THE TRIAL. PT ALSO HAD SUPRASTERNAL RETRACTING DURING
BREATHING EFFORT ON THE TRIAL. PT RETURNED TO PREVIOUS SETTINGS AND RN WAS
UPDATED ON FINDINGS. AT THIS TIME, PT DOES NOT MEET CRITERIA FOR SUCCESSFUL
EXTUBATION.

## 2021-08-24 VITALS — SYSTOLIC BLOOD PRESSURE: 104 MMHG | DIASTOLIC BLOOD PRESSURE: 71 MMHG

## 2021-08-24 VITALS — DIASTOLIC BLOOD PRESSURE: 69 MMHG | SYSTOLIC BLOOD PRESSURE: 111 MMHG

## 2021-08-24 VITALS — DIASTOLIC BLOOD PRESSURE: 81 MMHG | SYSTOLIC BLOOD PRESSURE: 136 MMHG

## 2021-08-24 VITALS — DIASTOLIC BLOOD PRESSURE: 89 MMHG | SYSTOLIC BLOOD PRESSURE: 120 MMHG

## 2021-08-24 VITALS — SYSTOLIC BLOOD PRESSURE: 94 MMHG | DIASTOLIC BLOOD PRESSURE: 61 MMHG

## 2021-08-24 VITALS — DIASTOLIC BLOOD PRESSURE: 78 MMHG | SYSTOLIC BLOOD PRESSURE: 119 MMHG

## 2021-08-24 VITALS — DIASTOLIC BLOOD PRESSURE: 58 MMHG | SYSTOLIC BLOOD PRESSURE: 99 MMHG

## 2021-08-24 VITALS — DIASTOLIC BLOOD PRESSURE: 76 MMHG | SYSTOLIC BLOOD PRESSURE: 132 MMHG

## 2021-08-24 VITALS — SYSTOLIC BLOOD PRESSURE: 105 MMHG | DIASTOLIC BLOOD PRESSURE: 69 MMHG

## 2021-08-24 VITALS — DIASTOLIC BLOOD PRESSURE: 68 MMHG | SYSTOLIC BLOOD PRESSURE: 104 MMHG

## 2021-08-24 VITALS — SYSTOLIC BLOOD PRESSURE: 100 MMHG | DIASTOLIC BLOOD PRESSURE: 59 MMHG

## 2021-08-24 VITALS — SYSTOLIC BLOOD PRESSURE: 106 MMHG | DIASTOLIC BLOOD PRESSURE: 66 MMHG

## 2021-08-24 VITALS — DIASTOLIC BLOOD PRESSURE: 63 MMHG | SYSTOLIC BLOOD PRESSURE: 97 MMHG

## 2021-08-24 VITALS — DIASTOLIC BLOOD PRESSURE: 65 MMHG | SYSTOLIC BLOOD PRESSURE: 105 MMHG

## 2021-08-24 VITALS — DIASTOLIC BLOOD PRESSURE: 76 MMHG | SYSTOLIC BLOOD PRESSURE: 121 MMHG

## 2021-08-24 VITALS — DIASTOLIC BLOOD PRESSURE: 72 MMHG | SYSTOLIC BLOOD PRESSURE: 120 MMHG

## 2021-08-24 VITALS — SYSTOLIC BLOOD PRESSURE: 107 MMHG | DIASTOLIC BLOOD PRESSURE: 72 MMHG

## 2021-08-24 VITALS — DIASTOLIC BLOOD PRESSURE: 64 MMHG | SYSTOLIC BLOOD PRESSURE: 103 MMHG

## 2021-08-24 VITALS — SYSTOLIC BLOOD PRESSURE: 101 MMHG | DIASTOLIC BLOOD PRESSURE: 64 MMHG

## 2021-08-24 VITALS — DIASTOLIC BLOOD PRESSURE: 72 MMHG | SYSTOLIC BLOOD PRESSURE: 109 MMHG

## 2021-08-24 VITALS — DIASTOLIC BLOOD PRESSURE: 73 MMHG | SYSTOLIC BLOOD PRESSURE: 112 MMHG

## 2021-08-24 VITALS — DIASTOLIC BLOOD PRESSURE: 59 MMHG | SYSTOLIC BLOOD PRESSURE: 95 MMHG

## 2021-08-24 VITALS — SYSTOLIC BLOOD PRESSURE: 119 MMHG | DIASTOLIC BLOOD PRESSURE: 82 MMHG

## 2021-08-24 VITALS — DIASTOLIC BLOOD PRESSURE: 65 MMHG | SYSTOLIC BLOOD PRESSURE: 106 MMHG

## 2021-08-24 LAB
ANION GAP SERPL CALCULATED.3IONS-SCNC: 5 MMOL/L
BASOPHILS NFR BLD AUTO: 0 % (ref 0–3)
BUN SERPL-MCNC: 38 MG/DL (ref 7–17)
BUN/CREAT SERPL: 90
CHLORIDE SERPL-SCNC: 106 MMOL/L (ref 95–108)
CO2 SERPL-SCNC: 38 MMOL/L (ref 22–30)
CREAT SERPL-MCNC: 0.4 MG/DL (ref 0.5–1)
CRP SERPL-MCNC: 3.5 MG/DL (ref 0–0.9)
EOSINOPHIL NFR BLD AUTO: 1 % (ref 0–8)
ERYTHROCYTE [DISTWIDTH] IN BLOOD BY AUTOMATED COUNT: 15.6 % (ref 11.5–15.5)
HCT VFR BLD AUTO: 40.8 % (ref 37–47)
HGB BLD-MCNC: 12.9 G/DL (ref 12–16)
IMM GRANULOCYTES NFR BLD: 0.6 % (ref 0–5)
LYMPHOCYTES NFR BLD: 21 % (ref 15–41)
MCH RBC QN AUTO: 29.8 PG  CALC (ref 26–32)
MCHC RBC AUTO-ENTMCNC: 31.6 G/DL CAL (ref 32–36)
MCV RBC AUTO: 94.2 FL  CALC (ref 80–100)
MONOCYTES NFR BLD AUTO: 9 % (ref 2–13)
NEUTROPHILS # BLD AUTO: 7.43 THOU/UL (ref 2–7.15)
NEUTROPHILS NFR BLD AUTO: 68 % (ref 42–76)
PLATELET # BLD AUTO: 298 THOU/UL (ref 130–400)
POTASSIUM SERPL-SCNC: 3.5 MMOL/L (ref 3.5–5.1)
RBC # BLD AUTO: 4.33 MILL/UL (ref 4.2–5.6)
SODIUM SERPL-SCNC: 146 MMOL/L (ref 137–146)

## 2021-08-24 NOTE — NUR
SHADY KAYE RRT COLLECTING ABG. AM LABS COLLECTED VIA L-SUBCLAV TLC. POINT OF
CARE GLUCOSE 172mg/dl. ORAL SUCTION AND ORAL CARE PERFORMED.

## 2021-08-24 NOTE — NUR
DAUGHTER PHONED FOR UPDATE. UPDATE PROVIDED. PATIENT RESTING IN BED INTUBATED
AND SEDATED. WILL CONTINUE TO MONITOR.

## 2021-08-24 NOTE — NUR
RT AT BEDSIDE AT THIS TIME TRYING PATIENT ON SPONTANEOUS BREATHING TRIALS.
PATIENT TAKING LOW TIDAL VOLUMES AT THIS TIME. WILL OCNTINUE TO MONITOR

## 2021-08-24 NOTE — NUR
ORAL CARE COMPLETED. FACE WASHED WITH WARM WASH CLOTH. MOUTH MOISTURIZER
APPLIED. ET SUCTION PERFORMED. COUGH/GAG REFLEX PRESENT. SUCTION RESULTS IN
SMALL THICK AMOUNT SPUTUM.

## 2021-08-24 NOTE — NUR
PATIENT RESTING IN BED INTUBATED AND SEDATED AT THIS TIME. SHIFT ASSESSMENT
COMPLETED AT THIS TIME. IV PATENT X1. VSS ON MONITOR. WILL CONTINUE TO MONITOR
CLOSELY.

## 2021-08-24 NOTE — NUR
ATTEMPTED WEANING TRIAL AFTER A PROLONGED TIME OFF SEDATION. PT DID NOT FOLLOW
ANY COMMANDS. PT CONTINUED TO BREATH RAPID AND SHALLOW DESPITE COMMANDS TO
TAKE DEEPER SLOWER BREATHES. VT OF 175ML WITH A 65 BPM RATE. PT DOES NOT MEET
CRITERIA FOR EXTUBATION AT THIS TIME.

## 2021-08-24 NOTE — NUR
REPORT RECEIVED VAUGHN RITCHIE RN. CARE OF PT ASSUMED AT THIS TIME. YOBANI MORGAN CNA
ASSIGNED TO PT CARE.

## 2021-08-25 VITALS — DIASTOLIC BLOOD PRESSURE: 75 MMHG | SYSTOLIC BLOOD PRESSURE: 119 MMHG

## 2021-08-25 VITALS — DIASTOLIC BLOOD PRESSURE: 82 MMHG | SYSTOLIC BLOOD PRESSURE: 130 MMHG

## 2021-08-25 VITALS — SYSTOLIC BLOOD PRESSURE: 116 MMHG | DIASTOLIC BLOOD PRESSURE: 71 MMHG

## 2021-08-25 VITALS — SYSTOLIC BLOOD PRESSURE: 102 MMHG | DIASTOLIC BLOOD PRESSURE: 66 MMHG

## 2021-08-25 VITALS — SYSTOLIC BLOOD PRESSURE: 135 MMHG | DIASTOLIC BLOOD PRESSURE: 76 MMHG

## 2021-08-25 VITALS — DIASTOLIC BLOOD PRESSURE: 72 MMHG | SYSTOLIC BLOOD PRESSURE: 107 MMHG

## 2021-08-25 VITALS — DIASTOLIC BLOOD PRESSURE: 81 MMHG | SYSTOLIC BLOOD PRESSURE: 116 MMHG

## 2021-08-25 VITALS — DIASTOLIC BLOOD PRESSURE: 67 MMHG | SYSTOLIC BLOOD PRESSURE: 98 MMHG

## 2021-08-25 VITALS — SYSTOLIC BLOOD PRESSURE: 143 MMHG | DIASTOLIC BLOOD PRESSURE: 79 MMHG

## 2021-08-25 VITALS — DIASTOLIC BLOOD PRESSURE: 67 MMHG | SYSTOLIC BLOOD PRESSURE: 97 MMHG

## 2021-08-25 VITALS — SYSTOLIC BLOOD PRESSURE: 122 MMHG | DIASTOLIC BLOOD PRESSURE: 76 MMHG

## 2021-08-25 VITALS — DIASTOLIC BLOOD PRESSURE: 83 MMHG | SYSTOLIC BLOOD PRESSURE: 123 MMHG

## 2021-08-25 VITALS — DIASTOLIC BLOOD PRESSURE: 73 MMHG | SYSTOLIC BLOOD PRESSURE: 116 MMHG

## 2021-08-25 VITALS — DIASTOLIC BLOOD PRESSURE: 75 MMHG | SYSTOLIC BLOOD PRESSURE: 110 MMHG

## 2021-08-25 VITALS — SYSTOLIC BLOOD PRESSURE: 134 MMHG | DIASTOLIC BLOOD PRESSURE: 79 MMHG

## 2021-08-25 VITALS — SYSTOLIC BLOOD PRESSURE: 115 MMHG | DIASTOLIC BLOOD PRESSURE: 72 MMHG

## 2021-08-25 VITALS — DIASTOLIC BLOOD PRESSURE: 62 MMHG | SYSTOLIC BLOOD PRESSURE: 96 MMHG

## 2021-08-25 VITALS — SYSTOLIC BLOOD PRESSURE: 108 MMHG | DIASTOLIC BLOOD PRESSURE: 119 MMHG

## 2021-08-25 VITALS — DIASTOLIC BLOOD PRESSURE: 89 MMHG | SYSTOLIC BLOOD PRESSURE: 145 MMHG

## 2021-08-25 VITALS — SYSTOLIC BLOOD PRESSURE: 108 MMHG | DIASTOLIC BLOOD PRESSURE: 72 MMHG

## 2021-08-25 VITALS — DIASTOLIC BLOOD PRESSURE: 78 MMHG | SYSTOLIC BLOOD PRESSURE: 124 MMHG

## 2021-08-25 VITALS — DIASTOLIC BLOOD PRESSURE: 67 MMHG | SYSTOLIC BLOOD PRESSURE: 90 MMHG

## 2021-08-25 VITALS — SYSTOLIC BLOOD PRESSURE: 100 MMHG | DIASTOLIC BLOOD PRESSURE: 71 MMHG

## 2021-08-25 VITALS — SYSTOLIC BLOOD PRESSURE: 112 MMHG | DIASTOLIC BLOOD PRESSURE: 70 MMHG

## 2021-08-25 VITALS — SYSTOLIC BLOOD PRESSURE: 107 MMHG | DIASTOLIC BLOOD PRESSURE: 68 MMHG

## 2021-08-25 VITALS — DIASTOLIC BLOOD PRESSURE: 76 MMHG | SYSTOLIC BLOOD PRESSURE: 128 MMHG

## 2021-08-25 LAB
ANION GAP SERPL CALCULATED.3IONS-SCNC: 3 MMOL/L
BASOPHILS NFR BLD AUTO: 0 % (ref 0–3)
BUN SERPL-MCNC: 28 MG/DL (ref 7–17)
BUN/CREAT SERPL: 93
CHLORIDE SERPL-SCNC: 107 MMOL/L (ref 95–108)
CO2 SERPL-SCNC: 39 MMOL/L (ref 22–30)
CREAT SERPL-MCNC: 0.3 MG/DL (ref 0.5–1)
EOSINOPHIL NFR BLD AUTO: 3 % (ref 0–8)
ERYTHROCYTE [DISTWIDTH] IN BLOOD BY AUTOMATED COUNT: 15.6 % (ref 11.5–15.5)
HCT VFR BLD AUTO: 38.1 % (ref 37–47)
HGB BLD-MCNC: 11.8 G/DL (ref 12–16)
IMM GRANULOCYTES NFR BLD: 1 % (ref 0–5)
LYMPHOCYTES NFR BLD: 22 % (ref 15–41)
MAGNESIUM SERPL-MCNC: 2.3 MG/DL (ref 1.6–2.3)
MCH RBC QN AUTO: 29.5 PG  CALC (ref 26–32)
MCHC RBC AUTO-ENTMCNC: 31 G/DL CAL (ref 32–36)
MCV RBC AUTO: 95.3 FL  CALC (ref 80–100)
MONOCYTES NFR BLD AUTO: 9 % (ref 2–13)
NEUTROPHILS # BLD AUTO: 5.7 THOU/UL (ref 2–7.15)
NEUTROPHILS NFR BLD AUTO: 65 % (ref 42–76)
PLATELET # BLD AUTO: 254 THOU/UL (ref 130–400)
POTASSIUM SERPL-SCNC: 3.2 MMOL/L (ref 3.5–5.1)
RBC # BLD AUTO: 4 MILL/UL (ref 4.2–5.6)
SODIUM SERPL-SCNC: 146 MMOL/L (ref 137–146)

## 2021-08-25 NOTE — NUR
PT COUGHING. EYES OPEN. APPEARS IN DISTRESS. VERSED GTT RESTARTED AT 10ML/H.
ET SUCTION PERFORMED. COUGHING AND GAGGING RESULTS IN APPROX 50ML EMESIS. ORAL
SUCTION PERFORMED. ORAL CARE PERFORMED. PT CALMER. SPO2 97%  ON MONITOR.
NO MORE COUGH OR EMESIS.

## 2021-08-25 NOTE — NUR
TUBE FEED PROVIDED 1.5 PULMOCARE WITH 200ML FLUSH. POTASSIUM WAS 3.2
REPLACEMENT WAS GIVE ON 20MEQ. PATIENT IS ABLE TO OPEN EYES ON COMMAND. ORAL
HYGIENE WAS PROVIDED. PATIENT HAD A BM MEDIUM SIZE VERY LOOSE BROWN. CHANGED
AND REPOSITIONED. WILL CONTINUE TO MONITOR.

## 2021-08-25 NOTE — NUR
CALL RECEIVED FROM BARTOLO, PT'S DAUGHTER. COMMUNICATION CODE PROVIDED.
UPDATES ON PT'S STATUS PROVIDED TO BARTOLO AS REQUESTED.

## 2021-08-25 NOTE — NUR
PT COUGHING AND GAGGING. MOVING HEAD FROM SIDE TO SIDE. ET TUBE BAEZ APPEARS
LOOSE. NAN YARBROUGH RRT CALLED INTO ROOM. STANDING OUTSIDE ROOM STATS "IT'S
FINE." ET SUCTION PERFORMED. SEDATION INCREASED. VERSED TO 2MG/H AND PROPOFOL
TO 30MCG/KG/MIN. WILL CON'T TO MONITOR.

## 2021-08-25 NOTE — NUR
PATIENTS DAUGHTER UPDATED ON CONDITION AND INFORMED OF BREATHING TRIAL DONE.
PATIENTS FIO2 DECREASED TO 50%.

## 2021-08-25 NOTE — NUR
SPONTANEOUS AWAKENING AND BREATHING TRIAL FAILED PATIENT WAS PUT BACK ON
PROPOFOL GTT. PATINETS HEART RATE INCREASE TO 120S
AND BLOOD PRESSURE INCREASE SYSTOLIC INCREASED . HER WOB WAS INCREASED
IN THE 50S. HER BREATHING WAS SHALLOW AND FAST RESPITORY THERAPIST TURN AUTO
VENT BACK ON. WILL CONTINUE TO MONITOR AND INCREASE PROPOFOL IF NEED CURRENT
ON 15 MCG/KG/MIN. WILL CONTINUE TO MONITOR.

## 2021-08-25 NOTE — NUR
PT SEMIFOWLERS ON AIR MATTRESS. SEDETAED. B/L SOFT WRIST RESTRAINTS IN PLACE.
SCDS ON. INTUBATED 8.0 ET TAPED 23CM AT THE LIP. A/C ON VENT WITH FIO2 50%
RATE 28, PEEP 8, . SP02 97%. LUNGS SOUNDS CLEAR AND DIMINISHED. OG TUBE
IN PLACE TO LIS. L-SUBCLAVIAN TLC PATENT, INFUSING PROPOFOL AT 25MCG/KG/MIN,
VERSED AT 10MG/H. POSITIVE FOR BLOOD RETURN. . ON MONITOR. NIBP
116/81mmHg. TEMP 97.4. ET SUCTION PERFORMED. NO SECRETIONS. POSITIVE GAG
REFLEX. ORAL CARE PERFORMED. STEPHEN SECURED TO LEG WITH TUBING UNKINKED AND
UNOBSTRUCTED, DRAINING MARIO ALBERTO URINE. COVID-19 ISOLATION PRECAUTIONS
MAINTAINED. BED LOCKED IN LOW POSITION WITH BED RAILS UP X2.

## 2021-08-26 VITALS — SYSTOLIC BLOOD PRESSURE: 99 MMHG | DIASTOLIC BLOOD PRESSURE: 65 MMHG

## 2021-08-26 VITALS — DIASTOLIC BLOOD PRESSURE: 68 MMHG | SYSTOLIC BLOOD PRESSURE: 101 MMHG

## 2021-08-26 VITALS — DIASTOLIC BLOOD PRESSURE: 82 MMHG | SYSTOLIC BLOOD PRESSURE: 134 MMHG

## 2021-08-26 VITALS — DIASTOLIC BLOOD PRESSURE: 77 MMHG | SYSTOLIC BLOOD PRESSURE: 130 MMHG

## 2021-08-26 VITALS — DIASTOLIC BLOOD PRESSURE: 70 MMHG | SYSTOLIC BLOOD PRESSURE: 118 MMHG

## 2021-08-26 VITALS — SYSTOLIC BLOOD PRESSURE: 144 MMHG | DIASTOLIC BLOOD PRESSURE: 85 MMHG

## 2021-08-26 VITALS — SYSTOLIC BLOOD PRESSURE: 114 MMHG | DIASTOLIC BLOOD PRESSURE: 73 MMHG

## 2021-08-26 VITALS — DIASTOLIC BLOOD PRESSURE: 68 MMHG | SYSTOLIC BLOOD PRESSURE: 114 MMHG

## 2021-08-26 VITALS — DIASTOLIC BLOOD PRESSURE: 65 MMHG | SYSTOLIC BLOOD PRESSURE: 93 MMHG

## 2021-08-26 VITALS — SYSTOLIC BLOOD PRESSURE: 141 MMHG | DIASTOLIC BLOOD PRESSURE: 100 MMHG

## 2021-08-26 VITALS — SYSTOLIC BLOOD PRESSURE: 99 MMHG | DIASTOLIC BLOOD PRESSURE: 63 MMHG

## 2021-08-26 VITALS — SYSTOLIC BLOOD PRESSURE: 104 MMHG | DIASTOLIC BLOOD PRESSURE: 74 MMHG

## 2021-08-26 VITALS — DIASTOLIC BLOOD PRESSURE: 89 MMHG | SYSTOLIC BLOOD PRESSURE: 156 MMHG

## 2021-08-26 VITALS — SYSTOLIC BLOOD PRESSURE: 109 MMHG | DIASTOLIC BLOOD PRESSURE: 65 MMHG

## 2021-08-26 VITALS — DIASTOLIC BLOOD PRESSURE: 63 MMHG | SYSTOLIC BLOOD PRESSURE: 97 MMHG

## 2021-08-26 VITALS — DIASTOLIC BLOOD PRESSURE: 72 MMHG | SYSTOLIC BLOOD PRESSURE: 107 MMHG

## 2021-08-26 VITALS — DIASTOLIC BLOOD PRESSURE: 71 MMHG | SYSTOLIC BLOOD PRESSURE: 107 MMHG

## 2021-08-26 VITALS — SYSTOLIC BLOOD PRESSURE: 96 MMHG | DIASTOLIC BLOOD PRESSURE: 61 MMHG

## 2021-08-26 VITALS — SYSTOLIC BLOOD PRESSURE: 90 MMHG | DIASTOLIC BLOOD PRESSURE: 60 MMHG

## 2021-08-26 VITALS — DIASTOLIC BLOOD PRESSURE: 73 MMHG | SYSTOLIC BLOOD PRESSURE: 114 MMHG

## 2021-08-26 VITALS — SYSTOLIC BLOOD PRESSURE: 134 MMHG | DIASTOLIC BLOOD PRESSURE: 84 MMHG

## 2021-08-26 LAB
ANION GAP SERPL CALCULATED.3IONS-SCNC: 4 MMOL/L
BASOPHILS NFR BLD AUTO: 0 % (ref 0–3)
BUN SERPL-MCNC: 22 MG/DL (ref 7–17)
BUN/CREAT SERPL: 56
CHLORIDE SERPL-SCNC: 105 MMOL/L (ref 95–108)
CO2 SERPL-SCNC: 38 MMOL/L (ref 22–30)
CREAT SERPL-MCNC: 0.4 MG/DL (ref 0.5–1)
EOSINOPHIL NFR BLD AUTO: 2 % (ref 0–8)
ERYTHROCYTE [DISTWIDTH] IN BLOOD BY AUTOMATED COUNT: 15.7 % (ref 11.5–15.5)
HCT VFR BLD AUTO: 40 % (ref 37–47)
HGB BLD-MCNC: 12.1 G/DL (ref 12–16)
IMM GRANULOCYTES NFR BLD: 0.8 % (ref 0–5)
LYMPHOCYTES NFR BLD: 21 % (ref 15–41)
MCH RBC QN AUTO: 29.1 PG  CALC (ref 26–32)
MCHC RBC AUTO-ENTMCNC: 30.3 G/DL CAL (ref 32–36)
MCV RBC AUTO: 96.2 FL  CALC (ref 80–100)
MONOCYTES NFR BLD AUTO: 7 % (ref 2–13)
NEUTROPHILS # BLD AUTO: 7.57 THOU/UL (ref 2–7.15)
NEUTROPHILS NFR BLD AUTO: 69 % (ref 42–76)
PLATELET # BLD AUTO: 263 THOU/UL (ref 130–400)
POTASSIUM SERPL-SCNC: 3.9 MMOL/L (ref 3.5–5.1)
RBC # BLD AUTO: 4.16 MILL/UL (ref 4.2–5.6)
SODIUM SERPL-SCNC: 143 MMOL/L (ref 137–146)

## 2021-08-26 NOTE — NUR
RESTING SUPINE WITH HOB UP 45 DEGREES. OG TUNE PLACEMENT CONFIRMED, O RESIDUL
NOTED. BOLUS TF GIVEN  ML PULMOCARE FOLLOW BY 60 ML WATER. LEFT RESTING
WITH HOB UP. WILL CONTINUE TO MONITOR CLOSELY.

## 2021-08-26 NOTE — NUR
RESTING IN BED. ON VENT, SETTINGS AS DOUMENTED IN INTERVENTIONS. O2 SAT 87%
WADNER/RT IN WITH PATIENT LAVAGED ETT AND SUX ETT AND ORALLY FOR LARGE AMOUNT
OF SECREITONS. O2 SAT PICCKED BACK UP TO 9-92% AFTER SUCTIONING. PATIENT OPENS
EYES SPONTANEOUSLY. DOES NOT TRACK NOR LOOK TO SPEAKER. NOT FOLLOWING ANY
COMMANDS. WILL ATTEMPT TO USE SEDATION LIGHTLY. CURRENTLY ON 15 MCG OF
PROPOFOL. REPOSITIONED. CARDIAC MONITOR SHOWS ST. SCD'S IN PLACE. PATIENT
RESTING ON AIR MATTRESS.

## 2021-08-26 NOTE — NUR
DAUGHTER OUTISDE OF DOOR TO SEE PATIENT.WAIVER OBTAINED. UPDATES GIVEN.
DAUGHTER VERY UPSET. EMOTIONAL SUPPORT GIVEN.

## 2021-08-26 NOTE — NUR
PATIENT WITH EYES OPEN ON ROUNDS. DOES NOT TRACK, NOT FOLLOWING ANY
DIRECTIONS. MEDICATED WITH FENTANYL AS ORDERED. VSS. SR-ST ON MONITOR, HR
90'S-110.

## 2021-08-26 NOTE — NUR
PATIENT REMAINS INTUBATED AT THIS TIME. OFF OF SEDATION AT THIS TIME. AWAITING
CT SCAN. VSS ON MONITOR.

## 2021-08-26 NOTE — NUR
sbt initiated and terminated c/in 3 min as per pt rr > 54. rsbi >120. nif and
vs unable to perform. unable to manage/clear own airway or follow commands at
this time. rn and md aware. rrt placed back on prior settings. NAD. VSS.
weaned paramters as pt spo2 wnl.

## 2021-08-26 NOTE — NUR
PATIENT RESTING IN BED INTUBATED AND SEDATED AT THIS TIME. VSS ON MONITED.
WILL CONTINUE TO MONITOR.

## 2021-08-26 NOTE — NUR
PHONED [facility] TRANSF[facility] SPOKE WITH JYOTI. INFO GIVEN TO INITIATE
TRANSFER.

## 2021-08-26 NOTE — NUR
PATIENT DAUGHTER BARTOLO PHONED N FOR CONDITION UPDATE. DAUGHTER UPSET AND
CRYING, SUPPORT AND REASSURANCE PROVIDED.

## 2021-08-26 NOTE — NUR
PATIENT RESTING IN BED INTUBATED AND SEDATED AT THIS TIME. SHIFT ASSESSMENT
COMPLETED AT THIS TIME. IV PATENT X1. CENTRAL LINE DRESSING CHANGE COMPLETED
AT THIS TIME USING STERILE TECHNIQUE. VSS ON MONITOR. WILL CONTIUE TO MONITOR.

## 2021-08-26 NOTE — NUR
PULMOCARETUBE FEED ADMINISTERED. 8 OZ BOTTLE. 400ML FREE H20. BEDBATH AND
LINEN CHANGE COMPLETED. ORAL CARE AND ORAL SUCTIONING PERFORMED. ET SUCTION
PERFORMED. MODERATE AMOUNT THICK YELLOW/TAN SPUTUM. LABS DRAWN VIA TLC.

## 2021-08-27 VITALS — DIASTOLIC BLOOD PRESSURE: 78 MMHG | SYSTOLIC BLOOD PRESSURE: 116 MMHG

## 2021-08-27 VITALS — SYSTOLIC BLOOD PRESSURE: 102 MMHG | DIASTOLIC BLOOD PRESSURE: 64 MMHG

## 2021-08-27 VITALS — DIASTOLIC BLOOD PRESSURE: 66 MMHG | SYSTOLIC BLOOD PRESSURE: 99 MMHG

## 2021-08-27 VITALS — SYSTOLIC BLOOD PRESSURE: 111 MMHG | DIASTOLIC BLOOD PRESSURE: 75 MMHG

## 2021-08-27 VITALS — SYSTOLIC BLOOD PRESSURE: 99 MMHG | DIASTOLIC BLOOD PRESSURE: 68 MMHG

## 2021-08-27 VITALS — SYSTOLIC BLOOD PRESSURE: 119 MMHG | DIASTOLIC BLOOD PRESSURE: 71 MMHG

## 2021-08-27 VITALS — SYSTOLIC BLOOD PRESSURE: 123 MMHG | DIASTOLIC BLOOD PRESSURE: 80 MMHG

## 2021-08-27 VITALS — DIASTOLIC BLOOD PRESSURE: 94 MMHG | SYSTOLIC BLOOD PRESSURE: 167 MMHG

## 2021-08-27 VITALS — SYSTOLIC BLOOD PRESSURE: 104 MMHG | DIASTOLIC BLOOD PRESSURE: 62 MMHG

## 2021-08-27 VITALS — DIASTOLIC BLOOD PRESSURE: 77 MMHG | SYSTOLIC BLOOD PRESSURE: 109 MMHG

## 2021-08-27 VITALS — DIASTOLIC BLOOD PRESSURE: 85 MMHG | SYSTOLIC BLOOD PRESSURE: 154 MMHG

## 2021-08-27 VITALS — DIASTOLIC BLOOD PRESSURE: 96 MMHG | SYSTOLIC BLOOD PRESSURE: 168 MMHG

## 2021-08-27 VITALS — DIASTOLIC BLOOD PRESSURE: 91 MMHG | SYSTOLIC BLOOD PRESSURE: 135 MMHG

## 2021-08-27 VITALS — DIASTOLIC BLOOD PRESSURE: 46 MMHG | SYSTOLIC BLOOD PRESSURE: 87 MMHG

## 2021-08-27 VITALS — DIASTOLIC BLOOD PRESSURE: 61 MMHG | SYSTOLIC BLOOD PRESSURE: 102 MMHG

## 2021-08-27 VITALS — SYSTOLIC BLOOD PRESSURE: 96 MMHG | DIASTOLIC BLOOD PRESSURE: 61 MMHG

## 2021-08-27 VITALS — DIASTOLIC BLOOD PRESSURE: 76 MMHG | SYSTOLIC BLOOD PRESSURE: 102 MMHG

## 2021-08-27 VITALS — DIASTOLIC BLOOD PRESSURE: 72 MMHG | SYSTOLIC BLOOD PRESSURE: 104 MMHG

## 2021-08-27 VITALS — SYSTOLIC BLOOD PRESSURE: 122 MMHG | DIASTOLIC BLOOD PRESSURE: 79 MMHG

## 2021-08-27 VITALS — SYSTOLIC BLOOD PRESSURE: 123 MMHG | DIASTOLIC BLOOD PRESSURE: 77 MMHG

## 2021-08-27 VITALS — SYSTOLIC BLOOD PRESSURE: 114 MMHG | DIASTOLIC BLOOD PRESSURE: 77 MMHG

## 2021-08-27 VITALS — SYSTOLIC BLOOD PRESSURE: 99 MMHG | DIASTOLIC BLOOD PRESSURE: 60 MMHG

## 2021-08-27 LAB
ALBUMIN SERPL-MCNC: 2.7 G/DL (ref 3.2–5)
ALP SERPL-CCNC: 242 U/L (ref 38–126)
ANION GAP SERPL CALCULATED.3IONS-SCNC: 6 MMOL/L
AST SERPL-CCNC: 82 U/L (ref 14–36)
BASOPHILS NFR BLD AUTO: 0 % (ref 0–3)
BUN SERPL-MCNC: 24 MG/DL (ref 7–17)
BUN/CREAT SERPL: 67
CHLORIDE SERPL-SCNC: 99 MMOL/L (ref 95–108)
CO2 SERPL-SCNC: 37 MMOL/L (ref 22–30)
CREAT SERPL-MCNC: 0.4 MG/DL (ref 0.5–1)
EOSINOPHIL NFR BLD AUTO: 1 % (ref 0–8)
ERYTHROCYTE [DISTWIDTH] IN BLOOD BY AUTOMATED COUNT: 15.2 % (ref 11.5–15.5)
HCT VFR BLD AUTO: 39.4 % (ref 37–47)
HGB BLD-MCNC: 12.2 G/DL (ref 12–16)
IMM GRANULOCYTES NFR BLD: 0.7 % (ref 0–5)
LYMPHOCYTES NFR BLD: 22 % (ref 15–41)
MCH RBC QN AUTO: 29.4 PG  CALC (ref 26–32)
MCHC RBC AUTO-ENTMCNC: 31 G/DL CAL (ref 32–36)
MCV RBC AUTO: 94.9 FL  CALC (ref 80–100)
MONOCYTES NFR BLD AUTO: 9 % (ref 2–13)
NEUTROPHILS # BLD AUTO: 7.57 THOU/UL (ref 2–7.15)
NEUTROPHILS NFR BLD AUTO: 68 % (ref 42–76)
PLATELET # BLD AUTO: 242 THOU/UL (ref 130–400)
POTASSIUM SERPL-SCNC: 4 MMOL/L (ref 3.5–5.1)
PROT SERPL-MCNC: 5.7 G/DL (ref 6.3–8.2)
RBC # BLD AUTO: 4.15 MILL/UL (ref 4.2–5.6)
SODIUM SERPL-SCNC: 138 MMOL/L (ref 137–146)

## 2021-08-27 NOTE — NUR
PATIENT RESTING IN BED AWAKE. PATIENT SLAVA TO FOLLOW VERBAL COMMANDS AT THIS
TIME. VSS ON MONITOR. WILL CONTINUE TO MONITOR.

## 2021-08-27 NOTE — NUR
PATIENT HAVING RAPID RESPIRATIONS. PATIENT PROVIDED REASSURANCE AND ENCOURAGED
TO SLOW RESPIRATIONS. WILL CONTINUE TO MONITOR.

## 2021-08-27 NOTE — NUR
patient remains awake and intubated. phoned patient son connected to cordless.
olayinka talked with mother and provided encouraging words. explained that the
plan is to extubate this afternoon.

## 2021-08-27 NOTE — NUR
PATIENT EXTUBATED AT THIS TIME. PATIENT PLACED ON BIPAP. PHONED BRITNEY JONES FOR
MEDICATION FOR ANXIETY. MEDS GIVEN PER MAR. PATIENT TOLERATING BIPAP WELL. RT
TO MONITOR. VSS ON MONITOR. CALL LIGHT IN REACH. WILL CONTINUE TO MONITOR.

## 2021-08-27 NOTE — NUR
PATIENT RESTING IN BED WITH EYES OPEN. PATIENT REMAINS INTUBATED AT THIS TIME.
PROPOFOL TURNED OFF AT THIS TIME. PATIENT IS RESPONSIVE AT THIS TIME. SHIFT
ASSESSMENT COMPLETED AT THIS TIME. IV PATENT X1. PATIENT NODS HEADS TO
QUESTIONS. PATIENT FOLLOWS SIMPLE COMMANDS. PHONED DAUGHTER BARTOLO AND
CONNECTED TO [employer] AND GAVE UPDATE AND HAD HER TALK ON THE PHONE TO MOM.
ENCOURAGE PATIENT TO SLOW BREATHING DOWN AS WELL. WILL CONTINUE TO MONITOR.

## 2021-08-27 NOTE — NUR
COMPLETE BED BATH GIVEN AND LINENS CHANGED. ORAL CARE PROVIDED. SUX ETT AND
ORALLY. TURNED AND REPOSITIONED. BARRIER OINTMENT APPLIED TO BUTTOCK. SLT
EXCORIATION TO RIGHT RECTAL AREA.

## 2021-08-27 NOTE — NUR
PATIENT GIVEN TOOTHETE WITH WATER ON IT TO MOISTEN MOUTH. RT AT BEDSIDE TO
WEAN BIPAP. PT TOLERATED WELL. CALL LIGHT IN REACH. WILL CONTINUE TO MONITOR.

## 2021-08-27 NOTE — NUR
RESTING WITH EYES CLOSED. PROPOFOL CONTINUES AT 15 MCG. VSS. SR-ST ON MONITOR.
VENT SETTINGS UNCHANGED.

## 2021-08-27 NOTE — NUR
ETUBATED TO CPAP +12/1.0 AND WEANED AS PER SPO2. PT GLEN WELL AT THIS TIME. RN
AWARE. RRT TO MONITOR.

## 2021-08-27 NOTE — NUR
OGT FOUND DISLODGED EARLIER. REPLACED #16 FR OGT WITHOUT DIFFICULTY BY WALLY MULTANI/RN. PLACEMENT CONFURMED.

## 2021-08-27 NOTE — NUR
RESTING BED IN FOWLERS POSITION. OPENS EYES TO NAME. PATIENT LOOKS TO SPEAKER.
NOT FOLLOWING ANY COMMANDS AT THIS TIME. RESP NON-LABORED. ON CPAP, PRESSURE
OF 12, O2 50% O2 SAT 97-98% BREATH SOUNDS DIMINISHED. GENERALIZED EDEMA
PRESENT. PERIPHERAL PULSES INTACT. SCD'S IN PLACE BILATERALLY. LEFT CHEST PORT
PREVIOUSLY ACCESSED, DSG CDI, ALL PORTS SALINE LOCKED. VSS. CARDIAC MONITOR
SHOWS SR. EXPLAINED PLAN OF CARE.

## 2021-08-27 NOTE — NUR
OG PLACEMENT CONFIRMED. NO RESIDUAL. PULMOCARE BOLUS TF GIVEN, FLUSHED WITH
WATER POST FEED. HOB UP IN SEMIFOWLERS POSITION.

## 2021-08-27 NOTE — NUR
sbt initiated and d/cd as per pt rr increased to 54. krrt placed on prior
settings. sxn x 2 and changed hme. pt laurie well at this time. nad. vss. rrt to
monitor.

## 2021-08-27 NOTE — NUR
MD AT BEDSIDE AT THIS TIME. PT CONTINUES BE AWAKE. PLAN OF CARE DISCUSSED. NEW
ORDERS RECEIVED. WILL CONTINUE TO MONITOR.

## 2021-08-28 VITALS — DIASTOLIC BLOOD PRESSURE: 73 MMHG | SYSTOLIC BLOOD PRESSURE: 123 MMHG

## 2021-08-28 VITALS — SYSTOLIC BLOOD PRESSURE: 118 MMHG | DIASTOLIC BLOOD PRESSURE: 85 MMHG

## 2021-08-28 VITALS — DIASTOLIC BLOOD PRESSURE: 67 MMHG | SYSTOLIC BLOOD PRESSURE: 119 MMHG

## 2021-08-28 VITALS — DIASTOLIC BLOOD PRESSURE: 63 MMHG | SYSTOLIC BLOOD PRESSURE: 110 MMHG

## 2021-08-28 VITALS — SYSTOLIC BLOOD PRESSURE: 129 MMHG | DIASTOLIC BLOOD PRESSURE: 72 MMHG

## 2021-08-28 VITALS — SYSTOLIC BLOOD PRESSURE: 95 MMHG | DIASTOLIC BLOOD PRESSURE: 63 MMHG

## 2021-08-28 VITALS — SYSTOLIC BLOOD PRESSURE: 126 MMHG | DIASTOLIC BLOOD PRESSURE: 75 MMHG

## 2021-08-28 VITALS — DIASTOLIC BLOOD PRESSURE: 80 MMHG | SYSTOLIC BLOOD PRESSURE: 114 MMHG

## 2021-08-28 VITALS — SYSTOLIC BLOOD PRESSURE: 92 MMHG | DIASTOLIC BLOOD PRESSURE: 57 MMHG

## 2021-08-28 VITALS — DIASTOLIC BLOOD PRESSURE: 60 MMHG | SYSTOLIC BLOOD PRESSURE: 93 MMHG

## 2021-08-28 VITALS — SYSTOLIC BLOOD PRESSURE: 92 MMHG | DIASTOLIC BLOOD PRESSURE: 66 MMHG

## 2021-08-28 VITALS — DIASTOLIC BLOOD PRESSURE: 80 MMHG | SYSTOLIC BLOOD PRESSURE: 111 MMHG

## 2021-08-28 VITALS — DIASTOLIC BLOOD PRESSURE: 80 MMHG | SYSTOLIC BLOOD PRESSURE: 124 MMHG

## 2021-08-28 VITALS — SYSTOLIC BLOOD PRESSURE: 100 MMHG | DIASTOLIC BLOOD PRESSURE: 56 MMHG

## 2021-08-28 VITALS — DIASTOLIC BLOOD PRESSURE: 77 MMHG | SYSTOLIC BLOOD PRESSURE: 119 MMHG

## 2021-08-28 VITALS — SYSTOLIC BLOOD PRESSURE: 126 MMHG | DIASTOLIC BLOOD PRESSURE: 80 MMHG

## 2021-08-28 VITALS — SYSTOLIC BLOOD PRESSURE: 106 MMHG | DIASTOLIC BLOOD PRESSURE: 74 MMHG

## 2021-08-28 VITALS — SYSTOLIC BLOOD PRESSURE: 109 MMHG | DIASTOLIC BLOOD PRESSURE: 73 MMHG

## 2021-08-28 VITALS — SYSTOLIC BLOOD PRESSURE: 117 MMHG | DIASTOLIC BLOOD PRESSURE: 90 MMHG

## 2021-08-28 VITALS — SYSTOLIC BLOOD PRESSURE: 87 MMHG | DIASTOLIC BLOOD PRESSURE: 64 MMHG

## 2021-08-28 VITALS — SYSTOLIC BLOOD PRESSURE: 111 MMHG | DIASTOLIC BLOOD PRESSURE: 75 MMHG

## 2021-08-28 LAB
ANION GAP SERPL CALCULATED.3IONS-SCNC: 6 MMOL/L
BASOPHILS NFR BLD AUTO: 1 % (ref 0–3)
BUN SERPL-MCNC: 28 MG/DL (ref 7–17)
BUN/CREAT SERPL: 89
CHLORIDE SERPL-SCNC: 102 MMOL/L (ref 95–108)
CO2 SERPL-SCNC: 37 MMOL/L (ref 22–30)
CREAT SERPL-MCNC: 0.3 MG/DL (ref 0.5–1)
CRP SERPL-MCNC: 3.4 MG/DL (ref 0–0.9)
EOSINOPHIL NFR BLD AUTO: 2 % (ref 0–8)
ERYTHROCYTE [DISTWIDTH] IN BLOOD BY AUTOMATED COUNT: 15.4 % (ref 11.5–15.5)
HCT VFR BLD AUTO: 38.5 % (ref 37–47)
HGB BLD-MCNC: 12 G/DL (ref 12–16)
IMM GRANULOCYTES NFR BLD: 0.5 % (ref 0–5)
LYMPHOCYTES NFR BLD: 24 % (ref 15–41)
MCH RBC QN AUTO: 29.6 PG  CALC (ref 26–32)
MCHC RBC AUTO-ENTMCNC: 31.2 G/DL CAL (ref 32–36)
MCV RBC AUTO: 94.8 FL  CALC (ref 80–100)
MONOCYTES NFR BLD AUTO: 9 % (ref 2–13)
NEUTROPHILS # BLD AUTO: 6.02 THOU/UL (ref 2–7.15)
NEUTROPHILS NFR BLD AUTO: 64 % (ref 42–76)
PLATELET # BLD AUTO: 221 THOU/UL (ref 130–400)
POTASSIUM SERPL-SCNC: 3.5 MMOL/L (ref 3.5–5.1)
RBC # BLD AUTO: 4.06 MILL/UL (ref 4.2–5.6)
SODIUM SERPL-SCNC: 142 MMOL/L (ref 137–146)

## 2021-08-28 NOTE — NUR
RRT AT BEDSIDE. PT DENIES ANY PAIN AT THIS TIME. PT ABLE TO SAY SMALL WORDS
LIKE "WATER" AND "THIRSTY". ABLE TO FOLLOW COMMANDS. ABLE TO  MY HANDS,
AND  LEGS.

## 2021-08-28 NOTE — NUR
Patient underwent PT AAROM exercise today for B LE in supine position (patient
needed maximum tactile cuing to finish exercise protocol).  Patient did supine
SLR, hip adduction, hip abduction, hip and knee flexion and extension, hip
internal and external rotation, and ankle pumps/stretching routine for 10 reps
x 2 sets with patient taking 1-2 rest periods lasting 2 to 3 minutes.  Patient
also did log rolling bed mobility ADLs with max A x 1 for 2 reps with constant
verbal cuing.

## 2021-08-28 NOTE — NUR
WEANED OFF OF NIV ONTO 50% VENTURI MASK. PT GLEN WELL. SPO2=94. FOLLOW
COMMANDS. RN ATTEMPTED TO GIV EPT A SIP OF WATER, HOWEVER PT COULD NOT KEEP
FROM POSSIBLE ASPIRATION. RN IN ROOM C RRT AND PT DURING THIS TIME. RRT TO
MONITOR. NIV IN ROOM FOR PRN USE AT THIS TIME.

## 2021-08-28 NOTE — NUR
PT IN BED. NO DISTRESS NOTED. RRT PLACED VENTI MASK, REMOVED CPAP; RRT AND
THIS WRITER TO MONITOR O2 CLOSELY. CURRENTLY SUSTAINING 95%. NO OTHER NEEDS AT
THIS TIME. PT INFORMED OF SIO DEATH; ORDER OBTAINED FOR IV ATIVAN 1MG Q6HRS
PRN FOR ANXIETY,AGITATION. AWAITING PROFILE OF MED BY CARDINAL.

## 2021-08-28 NOTE — NUR
PATIENT TAKING ICE CHIPS SPARINGLY. NO COUGHING NOTED. SITTING IN HIGH FOWLERS
POSITION. 'S. LOPRESSOR 5 MG IVP GIVEN AS ORDERED.

## 2021-08-28 NOTE — NUR
PT IN BED. AWAKENS WHEN NAME IS CALLED. CPAP IN PLACE, O2 SUSTAINING 94%.
ABLE TO ANSWER YES OR NO QUESTIONS WITH NODS. CLEAR/DIMINISHED BREATH SOUNDS
UPON AUSCULTATION. ACTIVE BOWEL SOUNDS X4 QUADRANTS. NO BM NOTED AT THIS TIME.
STEPHEN CATHETER DRAINING VIA GRAVITY. HEEL PROTECTORS APPLIED TO BILATERAL
HEELS. BILATERAL SCDS IN PLACE.  PT UNABLE TO FOLLOW COMMANDS AT THIS TIME.
ASSESSMENT COMPLETED. DISCUSSED POC; REINFORCEMENT NEEDED.  ISOLATION
PRECUATIONS IN PLACE, PT EDUCATED ON THE NEED FOR THESE PRECAUTIONS,
REINFORCEMENT NEEDED.

## 2021-08-28 NOTE — NUR
RESTING IN BED IN HIGH FOWLERS POSITION. PATIENT IS AWAKE, ORIENTED TO PERSON
AND PLACE. PATIENT SPEAKS IN A WHISPER. STATES THROAT IS SORE. ASSISTED WITH
ICE CHIPS. RESP NON-LABORED. O2 ON PER VENTI-MASK AT 50% O2 SAT 94% BREATH
SOUNDS CLEAR, DIMINISHED IN BILATERAL BASES. TRACE GENRALIZED EDEMA, IMPROVING
OVER PAST FEW DAYS. STEPHEN DRAINS DARK MARIO ALBERTO URINE. HEEL PROTECTORS IN PLACE.
ON AIR MATTRESS, SCDS IN PLACE. LSCTLC IN PLACE, DSG CDI. CARDIAC MONITOR
SHOWS ST. DISCUSSED PLAN OF CARE.

## 2021-08-28 NOTE — NUR
TURNED AND REPOSITONED. ACCU CHECK 103. NO INSULIN COVERAGE REQUIRED PER SS
PROTOCOL. VSS. SR ON MONITOR.

## 2021-08-28 NOTE — NUR
PATIENT OPENS EYES TO NAME. NODDING HEAD TO ANSWER YES AND NO QUESTIONS.
TURNED AND REPOSITIONED. VSS. O2 SAT MAINTIANING IN THE UPPER 90'S ON CPAP
100%

## 2021-08-28 NOTE — NUR
pt resting comfortably in bed. nad. vss. no changes to niv paramters at this
time. no rt interventions needed at this time. rrt to monitor.

## 2021-08-29 VITALS — SYSTOLIC BLOOD PRESSURE: 120 MMHG | DIASTOLIC BLOOD PRESSURE: 92 MMHG

## 2021-08-29 VITALS — SYSTOLIC BLOOD PRESSURE: 115 MMHG | DIASTOLIC BLOOD PRESSURE: 81 MMHG

## 2021-08-29 VITALS — DIASTOLIC BLOOD PRESSURE: 81 MMHG | SYSTOLIC BLOOD PRESSURE: 132 MMHG

## 2021-08-29 VITALS — DIASTOLIC BLOOD PRESSURE: 83 MMHG | SYSTOLIC BLOOD PRESSURE: 133 MMHG

## 2021-08-29 VITALS — SYSTOLIC BLOOD PRESSURE: 125 MMHG | DIASTOLIC BLOOD PRESSURE: 71 MMHG

## 2021-08-29 VITALS — SYSTOLIC BLOOD PRESSURE: 110 MMHG | DIASTOLIC BLOOD PRESSURE: 79 MMHG

## 2021-08-29 VITALS — DIASTOLIC BLOOD PRESSURE: 86 MMHG | SYSTOLIC BLOOD PRESSURE: 122 MMHG

## 2021-08-29 VITALS — SYSTOLIC BLOOD PRESSURE: 118 MMHG | DIASTOLIC BLOOD PRESSURE: 84 MMHG

## 2021-08-29 VITALS — SYSTOLIC BLOOD PRESSURE: 119 MMHG | DIASTOLIC BLOOD PRESSURE: 70 MMHG

## 2021-08-29 VITALS — DIASTOLIC BLOOD PRESSURE: 81 MMHG | SYSTOLIC BLOOD PRESSURE: 113 MMHG

## 2021-08-29 VITALS — DIASTOLIC BLOOD PRESSURE: 84 MMHG | SYSTOLIC BLOOD PRESSURE: 120 MMHG

## 2021-08-29 VITALS — SYSTOLIC BLOOD PRESSURE: 103 MMHG | DIASTOLIC BLOOD PRESSURE: 79 MMHG

## 2021-08-29 VITALS — DIASTOLIC BLOOD PRESSURE: 96 MMHG | SYSTOLIC BLOOD PRESSURE: 125 MMHG

## 2021-08-29 VITALS — SYSTOLIC BLOOD PRESSURE: 134 MMHG | DIASTOLIC BLOOD PRESSURE: 73 MMHG

## 2021-08-29 VITALS — SYSTOLIC BLOOD PRESSURE: 121 MMHG | DIASTOLIC BLOOD PRESSURE: 83 MMHG

## 2021-08-29 VITALS — SYSTOLIC BLOOD PRESSURE: 120 MMHG | DIASTOLIC BLOOD PRESSURE: 82 MMHG

## 2021-08-29 NOTE — NUR
RESTING IN BED WITH EYES CLOSED. RESP NON-LABORED. O2 ON PER 50% VENTI-MASK O2
SAT GREATER THAN 90% ASSISTED PATIENT WITH TAKING ICE CHIPS. VSS. ST ON
MONITOR.

## 2021-08-29 NOTE — NUR
PT PLACED ON HFNC AT 15 LITERS AT THE REQUEST OF DR SAENZ. PT CURRENTLY
MAINTAINING SATS AT 89%-91% ON 15 LITER HFNC.

## 2021-08-29 NOTE — NUR
RESTING IN BED IN SEMI-FOLWERS POSITION. PATIENT ALERT AND ORIENTED TO PERSON
AND PLACE. RESP NON-LABORED. CURRENTLY ON HFNC AT 15 L AND VM 50% O2 SAT 91%
BREATH SOUNDS DIMINISHED/CLEAR. LSCTLC IN PLACE, DSG CDI. SHIFT ASSESSMENT
COMPLETED. ASSISTED PATIENT WITH PO FLUIDS, NO COUGHING NOTED. TOOK PO MED
WITHOUT DIFFIULTY. ANSWERS QUESTIONS APPROPRIATELY. VOICE IS GETTING STRONGER.
STATES THROAT IS STILL ALITTLE SORE. CARDIAC MONITOR SHOWS SR. PATIENT
VEBRALIZES THAT HER  HAS . EMOTIONAL SUPPORT AND REASSURANCE TO
PATIENT. MEDICATED WITH XANAX AS ORDERED. DISCUSSED PLAN OF CARE. CALL BELL IN
REACH. PATIENT REFUSES SCD'S. ON AIR MATTRESS. AIR SCRUBBER ON IN ROOM.

## 2021-08-29 NOTE — NUR
PT IS NOW SLEEPING AND SATS BEGAN TO DROP ON HFNC. PT PLACED ON VENTURI MASK
50% WITH SATS RETURNING TO 92%.

## 2021-08-29 NOTE — NUR
PT SEEN WITH VENTIMASK IN PLACE, SATS IN THE LOW 90s.  PT IS ALERT, ORIENTED X
2-3.  LUNGS SOUND CLEAR, SLIGHTLY DIMINISHED.  PT PROVIDED ICE PER REQUEST AND
NO COUGH, DOES WELL.  PT APPEARS WEAK, HAS GROSS MOVEMENT IN HER ARMS BUT NOT
FINE MOVEMENT IN HER FINGERS YET.  STEPHEN IN PLACE.  DAUGHTER UPDATED ON PHONE.

## 2021-08-29 NOTE — NUR
PT USED CALL LIGHT APPROPRIATELY IN ASKING FOR BEDPAN HELP.  PT REMAINS ON
HIGH FLOW CANNULA AT 15 LPM, SATS IN THE MID 80s.

## 2021-08-29 NOTE — NUR
COMPLETE BED BATH GIVEN AND PARTIAL LINEN CHANGE. PATIENT TURNS SELF WELL FROM
SIDE TO SIDE. TAKING ICE CHIPS WITH ASSISTANCE. NO COUGHING. VSS. ST ON
MONITOR.

## 2021-08-29 NOTE — NUR
SLEEPS FOR SHORT INTERVALS. RESP NON-LABORED. REMAINS ON 50% VENTI-MASK. O2
SAT 92% CARDIAC MONITOR SHOWS SR.

## 2021-08-29 NOTE — NUR
VENTI MASK PLACED OVER HER HIGH FLOW NASAL CANNULA IN ORDER TO PROVIDE SATS IN
THE 90s.  PT RESTS IN THE BED IN NO ACUTE DISTRESS.

## 2021-08-29 NOTE — NUR
PT SEEN BY DR ALAN THIS MORNING, DIET ADVANCED TO SOLIDS.  PT PROVIDED SNACK,
NEEDED TO BE FED AS PER LACK OF MOTOR SKILLS.  PT PLACED ON HIGH FLOW CANNULA,
SATS IN THE MID TO UPPER 80s AS SHE IS A MOUTH BREATHER AND IS RESTING NOW.
TO MONITOR.

## 2021-08-29 NOTE — NUR
PT REMAINS ON 15 LITER HFNC, WITH SATS MAINTAINING AT 91%. RT NOTES THAT WHEN
PATIENT IS ASLEEP SHE BREATHES THROUGH HER MOUTH AND SATS WILL DROP SLIGHTLY.
VENTURI MASK ON STAND BY IN CASE IT IS NEEDED.

## 2021-08-30 VITALS — DIASTOLIC BLOOD PRESSURE: 71 MMHG | SYSTOLIC BLOOD PRESSURE: 93 MMHG

## 2021-08-30 VITALS — DIASTOLIC BLOOD PRESSURE: 75 MMHG | SYSTOLIC BLOOD PRESSURE: 109 MMHG

## 2021-08-30 VITALS — DIASTOLIC BLOOD PRESSURE: 74 MMHG | SYSTOLIC BLOOD PRESSURE: 111 MMHG

## 2021-08-30 VITALS — DIASTOLIC BLOOD PRESSURE: 76 MMHG | SYSTOLIC BLOOD PRESSURE: 104 MMHG

## 2021-08-30 VITALS — SYSTOLIC BLOOD PRESSURE: 108 MMHG | DIASTOLIC BLOOD PRESSURE: 78 MMHG

## 2021-08-30 VITALS — SYSTOLIC BLOOD PRESSURE: 98 MMHG | DIASTOLIC BLOOD PRESSURE: 69 MMHG

## 2021-08-30 VITALS — SYSTOLIC BLOOD PRESSURE: 97 MMHG | DIASTOLIC BLOOD PRESSURE: 71 MMHG

## 2021-08-30 VITALS — DIASTOLIC BLOOD PRESSURE: 78 MMHG | SYSTOLIC BLOOD PRESSURE: 119 MMHG

## 2021-08-30 VITALS — DIASTOLIC BLOOD PRESSURE: 88 MMHG | SYSTOLIC BLOOD PRESSURE: 113 MMHG

## 2021-08-30 VITALS — SYSTOLIC BLOOD PRESSURE: 112 MMHG | DIASTOLIC BLOOD PRESSURE: 75 MMHG

## 2021-08-30 VITALS — SYSTOLIC BLOOD PRESSURE: 116 MMHG | DIASTOLIC BLOOD PRESSURE: 74 MMHG

## 2021-08-30 VITALS — SYSTOLIC BLOOD PRESSURE: 90 MMHG | DIASTOLIC BLOOD PRESSURE: 71 MMHG

## 2021-08-30 LAB
ANION GAP SERPL CALCULATED.3IONS-SCNC: 8 MMOL/L
BASOPHILS NFR BLD AUTO: 1 % (ref 0–3)
BUN SERPL-MCNC: 23 MG/DL (ref 7–17)
BUN/CREAT SERPL: 73
CHLORIDE SERPL-SCNC: 98 MMOL/L (ref 95–108)
CO2 SERPL-SCNC: 34 MMOL/L (ref 22–30)
CREAT SERPL-MCNC: 0.3 MG/DL (ref 0.5–1)
EOSINOPHIL NFR BLD AUTO: 2 % (ref 0–8)
ERYTHROCYTE [DISTWIDTH] IN BLOOD BY AUTOMATED COUNT: 15.1 % (ref 11.5–15.5)
HCT VFR BLD AUTO: 41.7 % (ref 37–47)
HGB BLD-MCNC: 13.4 G/DL (ref 12–16)
IMM GRANULOCYTES NFR BLD: 0.5 % (ref 0–5)
LYMPHOCYTES NFR BLD: 33 % (ref 15–41)
MCH RBC QN AUTO: 29.6 PG  CALC (ref 26–32)
MCHC RBC AUTO-ENTMCNC: 32.1 G/DL CAL (ref 32–36)
MCV RBC AUTO: 92.3 FL  CALC (ref 80–100)
MONOCYTES NFR BLD AUTO: 11 % (ref 2–13)
NEUTROPHILS # BLD AUTO: 4.64 THOU/UL (ref 2–7.15)
NEUTROPHILS NFR BLD AUTO: 54 % (ref 42–76)
PLATELET # BLD AUTO: 274 THOU/UL (ref 130–400)
POTASSIUM SERPL-SCNC: 3.2 MMOL/L (ref 3.5–5.1)
RBC # BLD AUTO: 4.52 MILL/UL (ref 4.2–5.6)
SODIUM SERPL-SCNC: 137 MMOL/L (ref 137–146)

## 2021-08-30 NOTE — NUR
RESTING IN BED IN HIGH FOWLERS POSITION. ALERT AND ORIENTED. FOLLOWS
DIRECTIONS. SPEECH CLEAR WITH APPROPRIATE RESPONSE TO QUESTIONS. VSS. RESP
NON-LABORED. USING O2 AT 6 L HFNC. BREATH SOUNDS DIMINISHED. TAKING PO FLUIDS
WITHOUT DIFFICULTY. LSCTLC INTACT, DSG CI. CARDIAC MONITOR SHOWS SR. DISCUSSED
PLAN OF CARE. DENIES NEEDS AT THIS TIME. CALL BELL IN REACH. PATIENT IS ON AN
AIR MATTRESS. AIR SCRUBBER IN USE IN ROOM.

## 2021-08-30 NOTE — NUR
PATIENT SITTING UP IN BED NO DISTRESS NOTED ON O2 6L HF NASAL CANNULA. CNA AT
BEDSIDE ASSISTING PATIENT WITH FEEDING. WILL CONTINUE TO MONITOR.R

## 2021-08-30 NOTE — NUR
PT MEDICATED WITH PRN TYLENOL 650MG PO PER REQUEST FOR LOWER BACK PAIN RATING
8/10 ON THE PAIN SCALE,WILL MONITOR FOR EFECTIVENESS

## 2021-08-30 NOTE — NUR
Patient is seen today for functional training and deep breathing exercises.
SHe is able to get OOB and sit EOB with mod max assist of 1. Her vitals
remained stable but after about 30 minutes we transferred BTB as her sats were
at 90% and she was fatigued. She did practicec her 4 sec breath holds and
forced expiration.
She is progressing with her function although her Am Pac score is 11 and she
would benefit from rehab facility to increease her strength and endurance as
she is so weak she cannot feed herself

## 2021-08-30 NOTE — NUR
PATIENT RESTING IN BED AWAKE. PATIENT IS ALERT AND ORIENTED X3. SHIFT
ASSESSMENT COMPLETED AT THIS TIME. IV PATENT X1. CALL LIGHT IN REACH. WILL
CONTINUE TO MONITOR.

## 2021-08-30 NOTE — NUR
MODERATE AMOUNT OF INCONT AT THIS TIME.  16F STEPHEN CATH PLACE PER STERILE
TECHNIQUE. PT TOLERATED WELL. LEG STRAP IN PLACE. TUBING PATENT. FLOWING WITH
GRAVITY

## 2021-08-31 VITALS — DIASTOLIC BLOOD PRESSURE: 60 MMHG | SYSTOLIC BLOOD PRESSURE: 114 MMHG

## 2021-08-31 VITALS — SYSTOLIC BLOOD PRESSURE: 99 MMHG | DIASTOLIC BLOOD PRESSURE: 69 MMHG

## 2021-08-31 VITALS — DIASTOLIC BLOOD PRESSURE: 68 MMHG | SYSTOLIC BLOOD PRESSURE: 105 MMHG

## 2021-08-31 VITALS — DIASTOLIC BLOOD PRESSURE: 61 MMHG | SYSTOLIC BLOOD PRESSURE: 96 MMHG

## 2021-08-31 VITALS — SYSTOLIC BLOOD PRESSURE: 94 MMHG | DIASTOLIC BLOOD PRESSURE: 75 MMHG

## 2021-08-31 VITALS — SYSTOLIC BLOOD PRESSURE: 111 MMHG | DIASTOLIC BLOOD PRESSURE: 68 MMHG

## 2021-08-31 VITALS — DIASTOLIC BLOOD PRESSURE: 79 MMHG | SYSTOLIC BLOOD PRESSURE: 114 MMHG

## 2021-08-31 VITALS — DIASTOLIC BLOOD PRESSURE: 74 MMHG | SYSTOLIC BLOOD PRESSURE: 117 MMHG

## 2021-08-31 VITALS — SYSTOLIC BLOOD PRESSURE: 119 MMHG | DIASTOLIC BLOOD PRESSURE: 76 MMHG

## 2021-08-31 VITALS — SYSTOLIC BLOOD PRESSURE: 103 MMHG | DIASTOLIC BLOOD PRESSURE: 49 MMHG

## 2021-08-31 LAB
ALBUMIN SERPL-MCNC: 3.3 G/DL (ref 3.2–5)
ALP SERPL-CCNC: 256 U/L (ref 38–126)
ANION GAP SERPL CALCULATED.3IONS-SCNC: 9 MMOL/L
AST SERPL-CCNC: 143 U/L (ref 14–36)
BASOPHILS NFR BLD AUTO: 1 % (ref 0–3)
BUN SERPL-MCNC: 17 MG/DL (ref 7–17)
BUN/CREAT SERPL: 67
CHLORIDE SERPL-SCNC: 99 MMOL/L (ref 95–108)
CO2 SERPL-SCNC: 33 MMOL/L (ref 22–30)
CREAT SERPL-MCNC: 0.3 MG/DL (ref 0.5–1)
CRP SERPL-MCNC: 0.9 MG/DL (ref 0–0.9)
EOSINOPHIL NFR BLD AUTO: 3 % (ref 0–8)
ERYTHROCYTE [DISTWIDTH] IN BLOOD BY AUTOMATED COUNT: 15.2 % (ref 11.5–15.5)
HCT VFR BLD AUTO: 43.9 % (ref 37–47)
HGB BLD-MCNC: 14 G/DL (ref 12–16)
IMM GRANULOCYTES NFR BLD: 0.4 % (ref 0–5)
LYMPHOCYTES NFR BLD: 37 % (ref 15–41)
MCH RBC QN AUTO: 29.5 PG  CALC (ref 26–32)
MCHC RBC AUTO-ENTMCNC: 31.9 G/DL CAL (ref 32–36)
MCV RBC AUTO: 92.4 FL  CALC (ref 80–100)
MONOCYTES NFR BLD AUTO: 9 % (ref 2–13)
NEUTROPHILS # BLD AUTO: 4.19 THOU/UL (ref 2–7.15)
NEUTROPHILS NFR BLD AUTO: 50 % (ref 42–76)
PLATELET # BLD AUTO: 260 THOU/UL (ref 130–400)
POTASSIUM SERPL-SCNC: 3.3 MMOL/L (ref 3.5–5.1)
PROT SERPL-MCNC: 6.7 G/DL (ref 6.3–8.2)
RBC # BLD AUTO: 4.75 MILL/UL (ref 4.2–5.6)
SODIUM SERPL-SCNC: 139 MMOL/L (ref 137–146)

## 2021-08-31 NOTE — NUR
SEPSIS ALERT TRIGGERED. DR. CHAMPAGNE CALLED. NEW ORDER RECEIVED. NS @ 100
ML/HR INITIATED. PATIENT RESTING QUIETLY. NO ACUTE DISTRESS OBSERVED. BED IN
LOW POSITION. CALL LIGHT WITHIN REACH.

## 2021-08-31 NOTE — NUR
PT GOT TO THE MS FLOOR. TRANSFERED BY JASON. PT IN STABLE CONDITION. PT
ORIENTED TO ROOM. PT IS ALERT AND ORIENTED. S1 AND S2 HEARD UPON ASCULTATION.
LUNGS CLEAR. BOWELS ACTIVE IN ALL 4 QUADRANTS. PT IS COVID +. ON 3L HIGH FLOW.
 SKIN COLD AND DRY. PEDAL PULSES STRONG BILATERALLY. PT IS MAX FEED AND MAX
ASSIST. NO PAIN INDICATED. CALL LIGHT WITHIN REACH.

## 2021-08-31 NOTE — NUR
STEPHEN REMOVED. PATIENT ASSISTED UP TO BSC FOR BM. PATIENT CLEANSED. THEN
ASSISTED UP TO RECLINER. PATIENT TOLERATED TRANSFER WELL. PATIENT ASSISTED IN
CALLING DAUGHTER ON CELL PHONE. CALL LIGHT IN REACH. WILL CONTINUE TO MONITOR.

## 2021-08-31 NOTE — NUR
PATIENT RESTING IN BED AT THIS TIME. PATIENT IS AWAKE AND ALERT AND ORIENTED
X3. SHIFT ASSESSMENT COMPLETED AT THIS TIME. IV PATENT X1. CALL LIGHT IN
REACH. WILL CONTINUE TO MONITOR.

## 2021-08-31 NOTE — NUR
Pt received at recliner during mealtime. Monica continues to present with
significant weakness, impacting her ability to bring fork and 6oz beverage to
mouth. Monica was provided with mod A to flex shoulder and complete a
functional reach towards plate to puncture greenbeans and scoop mashed
potatoes x 5. Pt was not very hungry today and did not want to eat more
beyond that. Monica presents more alert than yesterday and was able to identify
the month and day of the year and described some details about her family.
PROM also provided to shoulder to prevent contractures, and patient has WNL
ROM at all joints but is limited actively against gravity d/t severe wkness.

## 2021-08-31 NOTE — NUR
PATIENT ASSISTED UP TO BSC TO VOID. LARGE VOID. PATIENT THEN TRANSFERRED TO
[facility] VIA BED BY NURSE. DAUGHTER NOTIFIED. ALL BELONGINGS SENT WITH
PATIENT.

## 2021-08-31 NOTE — NUR
ORDER RECEIVED FROM DR PRETTY TO DOWNGRADE PATIENT TO MEDSUR WITHOUT TELE.
DISCUSSED THAT PATIENT HR WAS ST 130S AND PATIENT O2 DROPS WITH EXERTION.

## 2021-08-31 NOTE — NUR
PATIENT AWAKE IN BED SEMI-FOWLERS. HEELS ELEVATED. ALERT AND ORIENTED X3. VERY
WEAK. REPOSITION FOR COMFORT. DENIES PAIN AT THIS TIME. O2 3L IN PLACE. NO
TELEMETRY. ASSESSMENT COMPLETED AND CHARTED. BED IN LOW POSITION. CALL LIGHT
WITHIN REACH.

## 2021-08-31 NOTE — NUR
SPEECH AND OCCUPATIONAL THERAPY AT BEDSIDE AT THIS TIME. ASSISTING PATIENT IN
EATING AT THIS TIME. PATIENT REMAINS UP IN RECLINER AT THIS TIME.

## 2021-08-31 NOTE — NUR
Pt received upright in bedside recliner on 5L via nasal cannula. Pt alert and
oriented to place, month, date, and year. Pt assisted by occupational
therapist with self-feeding of thin liquids by straw and mechanical soft
solids by fork. Pt presented adequate mastication time with multiple swallows
per bolus presentation. No overt s/s of penetration/aspiration appreciated.
Pt's vocal quality sounded clear following PO trials. Pt's oxygen saturation
decreased from 90-85% following PO trials and conversation. Oxygen saturation
self-resolved within 4 minutes and cued breathing. Pt did not appear in
distress during this time.
 
Recommendations
Continue Mechanical soft solids and thin liquids
Aspiration precautions: HOB upright for PO intake, slow rate, small
bites/sips, remain upright after meals for >30 minutes.

## 2021-09-01 VITALS — DIASTOLIC BLOOD PRESSURE: 64 MMHG | SYSTOLIC BLOOD PRESSURE: 108 MMHG

## 2021-09-01 VITALS — SYSTOLIC BLOOD PRESSURE: 95 MMHG | DIASTOLIC BLOOD PRESSURE: 69 MMHG

## 2021-09-01 VITALS — DIASTOLIC BLOOD PRESSURE: 71 MMHG | SYSTOLIC BLOOD PRESSURE: 114 MMHG

## 2021-09-01 VITALS — SYSTOLIC BLOOD PRESSURE: 98 MMHG | DIASTOLIC BLOOD PRESSURE: 68 MMHG

## 2021-09-01 VITALS — DIASTOLIC BLOOD PRESSURE: 67 MMHG | SYSTOLIC BLOOD PRESSURE: 99 MMHG

## 2021-09-01 LAB
ALBUMIN SERPL-MCNC: 3.1 G/DL (ref 3.2–5)
ALP SERPL-CCNC: 231 U/L (ref 38–126)
ANION GAP SERPL CALCULATED.3IONS-SCNC: 6 MMOL/L
AST SERPL-CCNC: 115 U/L (ref 14–36)
BASOPHILS NFR BLD AUTO: 1 % (ref 0–3)
BUN SERPL-MCNC: 20 MG/DL (ref 7–17)
BUN/CREAT SERPL: 71
CHLORIDE SERPL-SCNC: 102 MMOL/L (ref 95–108)
CO2 SERPL-SCNC: 34 MMOL/L (ref 22–30)
CREAT SERPL-MCNC: 0.3 MG/DL (ref 0.5–1)
EOSINOPHIL NFR BLD AUTO: 3 % (ref 0–8)
ERYTHROCYTE [DISTWIDTH] IN BLOOD BY AUTOMATED COUNT: 15.8 % (ref 11.5–15.5)
HCT VFR BLD AUTO: 41.7 % (ref 37–47)
HGB BLD-MCNC: 13.3 G/DL (ref 12–16)
IMM GRANULOCYTES NFR BLD: 0.3 % (ref 0–5)
LYMPHOCYTES NFR BLD: 41 % (ref 15–41)
MCH RBC QN AUTO: 29.7 PG  CALC (ref 26–32)
MCHC RBC AUTO-ENTMCNC: 31.9 G/DL CAL (ref 32–36)
MCV RBC AUTO: 93.1 FL  CALC (ref 80–100)
MONOCYTES NFR BLD AUTO: 9 % (ref 2–13)
NEUTROPHILS # BLD AUTO: 3.98 THOU/UL (ref 2–7.15)
NEUTROPHILS NFR BLD AUTO: 46 % (ref 42–76)
PLATELET # BLD AUTO: 255 THOU/UL (ref 130–400)
POTASSIUM SERPL-SCNC: 3.7 MMOL/L (ref 3.5–5.1)
PROT SERPL-MCNC: 6.3 G/DL (ref 6.3–8.2)
RBC # BLD AUTO: 4.48 MILL/UL (ref 4.2–5.6)
SODIUM SERPL-SCNC: 138 MMOL/L (ref 137–146)

## 2021-09-01 NOTE — NUR
Monica received in bed and appears to have recently woken up from nap. She
completed functional reaching forward to grasp vanilla milkshake and bring to
mouth. She was observed to lean head forward d/t increased wkness in UE. Monica
completed 5 reps of shoulder flexion to hit target (clinician's hand) on both
arms and demonstrated increased fatigue at end. Shoulders bilaterally
stretched in all planes. Completed walking and transfer with PT to chair where
patient was left with a call light.

## 2021-09-01 NOTE — NUR
PT SITTING IN BED, SET UP FOR DINNER. O2 REMAINS AT 4L. NO OTHER NEEDS AT THIS
TIME. CALL LIGHT WITHIN REACH.

## 2021-09-01 NOTE — NUR
SP02 93% AT REST ON 02 AT 4L/M VIA HIGH FLOW NC. RESPIRATIONS REGULAR AND
UNLABORED AT REST. PT AGREES TO NOTIFY NURSE OF ANY RESPIRATORY DISTRESS.
PULMONARY HYGIENE EDUCATION REVIEWED WITH PT.
 
L-SUBCLAVIAN TLC, DRESSING C/D/I. 2/3 PORTS PATENT WITH BLOOD RETURN.
 
PT USING BED PAN TO VOID. PT REQUESTS TO ATTEMPT TO GET UP TO BSC. PT ADVISED
INITIAL STAND UP SHOULD BE REFFERED PHYSICAL THERAPY.
 
SEE E-MAR FOR MEDICATION ADMINISTRATION DETAILS. MEDICATION EDUCATION
PROVIDED.
 
PT DENIES FURTHER NEEDS AT THIS TIME. BED LOCKED IN LOW POSITION WITH BEDRAILS
UP X2. ITEMS AND CALL BELL WITHIN REACH. AGREES TO CALL PRN. COVID-19
ISOLATION PRECAUTIONS MAINTAINED.

## 2021-09-01 NOTE — NUR
8/31/21
PT note- patient doing much better now on 3 liters O2 NC . She has gotten OOB
with nursing and presents up in bedside chair. She practiced DBE including
incentive spirometer and 4 sec breath holds. She maintained o2 at 90 % sats
but HR increased to 130s with standing. She was transferred BTB with max
assist and positioned side lying. She did receive tapotment and worked on
forced expiration as well. Am Pac score is 12 indicating she would do well in
ECF to regain strength and increase her independence

## 2021-09-01 NOTE — NUR
PT SITTING IN BED. A&O X3. CNA AT BEDSIDE ATTEMPTING TO FEED PT; PT REQUESTING
TO FEED HERSELF. PT ABLE TO FEED HERSELF BREAKFAST. PT ABLE TO FOLLOW
COMMANDS. BILATERAL HAND  MODERATE; ABLE TO LIFT LEGS SLIGHTY OFF THE AIR
MATRESS.  CLEAR/DIMINISHED BREATH SOUNDS UPON AUSCULTATION. O2 VIA NC @4L IN
PLACE SUSTAINING 95%. TRIPLE LUMEN LT SUBCLAVIAN IN PLACE, BLUE AND BROWN
LUMENS FLUSH WITH EASE; BLUE LUMEN INFUSING IVF PER MAR ORDER. UNABLE TO FLUSH
WHITE LUMEN AT THIS TIME. ACTIVE BOWEL SOUNDS X4 QUADRANTS. PT DENIES ANY PAIN
AT THIS TIME. ASSESSMENT COMPLETED. DISCUSSED POC. ISOLATION PRECAUTIONS IN
PLACE; PT EDUCATED ON THE NEED FOR THESE PRECAUTIONS. CALL LIGHT WITHIN REACH,
PT EDUCATED ON HOW TO PROPERLY USE CALL LIGHT.

## 2021-09-02 VITALS — DIASTOLIC BLOOD PRESSURE: 64 MMHG | SYSTOLIC BLOOD PRESSURE: 112 MMHG

## 2021-09-02 VITALS — DIASTOLIC BLOOD PRESSURE: 73 MMHG | SYSTOLIC BLOOD PRESSURE: 114 MMHG

## 2021-09-02 VITALS — DIASTOLIC BLOOD PRESSURE: 64 MMHG | SYSTOLIC BLOOD PRESSURE: 109 MMHG

## 2021-09-02 VITALS — SYSTOLIC BLOOD PRESSURE: 98 MMHG | DIASTOLIC BLOOD PRESSURE: 44 MMHG

## 2021-09-02 VITALS — DIASTOLIC BLOOD PRESSURE: 66 MMHG | SYSTOLIC BLOOD PRESSURE: 112 MMHG

## 2021-09-02 VITALS — DIASTOLIC BLOOD PRESSURE: 82 MMHG | SYSTOLIC BLOOD PRESSURE: 124 MMHG

## 2021-09-02 LAB
ANION GAP SERPL CALCULATED.3IONS-SCNC: 7 MMOL/L
BUN SERPL-MCNC: 13 MG/DL (ref 7–17)
BUN/CREAT SERPL: 43
CHLORIDE SERPL-SCNC: 106 MMOL/L (ref 95–108)
CO2 SERPL-SCNC: 30 MMOL/L (ref 22–30)
CREAT SERPL-MCNC: 0.3 MG/DL (ref 0.5–1)
ERYTHROCYTE [DISTWIDTH] IN BLOOD BY AUTOMATED COUNT: 15.6 % (ref 11.5–15.5)
HCT VFR BLD AUTO: 38 % (ref 37–47)
HGB BLD-MCNC: 12.2 G/DL (ref 12–16)
MAGNESIUM SERPL-MCNC: 2 MG/DL (ref 1.6–2.3)
MCH RBC QN AUTO: 30.3 PG  CALC (ref 26–32)
MCHC RBC AUTO-ENTMCNC: 32.1 G/DL CAL (ref 32–36)
MCV RBC AUTO: 94.3 FL  CALC (ref 80–100)
PLATELET # BLD AUTO: 197 THOU/UL (ref 130–400)
POTASSIUM SERPL-SCNC: 3.6 MMOL/L (ref 3.5–5.1)
RBC # BLD AUTO: 4.03 MILL/UL (ref 4.2–5.6)
SODIUM SERPL-SCNC: 140 MMOL/L (ref 137–146)

## 2021-09-02 NOTE — NUR
9/1/21
PT note: The patient is seen for transfer training and DBE as before. She is
much more alert. She managed to ambulate about 6 feet with max assist of 1 but
demonstrated possible orthostatic hypotension. Am Pac is unchanged although it
should improve as she gains strength. She was assisted BTB as before Our plan
is to continue DBE and improving her function by working on transfers and
standing

## 2021-09-02 NOTE — NUR
MILLIE RECEIVED AT BEDSIDE, WATCHING TELEVISION. MILLIE IS DEMONSTRATING
INCREASED STRENGTH, SPEED, AND QUALITY OF MOVEMENT WHILE REACHING FORWARD X
10. MILLIE PRACTICED STRAIGHT LEG RAISES X 10 ON BILAT LE
FOR IMPROVED BED MOBILITY STABILITY WITH TRANSFERS. PT PRACTICED REACHING
OVERHEAD WITH ER - REQUIRED MOD TACTILE ASSISTANCE TO COMPLETE D/T WKNESS. PT
DEMONSTRATING IMPROVED STRENGTH IN R>L.

## 2021-09-02 NOTE — NUR
PT note
The patient is seen for DBE and ambulation as before. She required min / mod
assist of 1 for ambualtion x 10 feet with vitals stable. She did complain of
mild dizziness. Her Am Pac score is 16 indicating she would do well with home
health followup Our plan is to continue progressing her function

## 2021-09-02 NOTE — NUR
PT LAYING IN BED, APPEARS TO BE SLEEPING COMFORTABLY. EYES CLOSED.
RESPIRATIONS REGULAR AND UNLABORED. CALL BELL REMIANS WITHIN REACH.

## 2021-09-02 NOTE — NUR
AM LABS COLLECTED VIA BROWN PORT OF L-SUBCLAVIAN TLC. NO DIFFICULTIES. PORT
FLUSHED AND SALINE LOCKED PER [facility] POLICY.

## 2021-09-02 NOTE — NUR
CENTRAL LINE REMOVED BY RANI VAIL RN PER BRITNEY KEITH ORDERS , CATHETER
INTACT, BIOPATCH APPLIED.  OCCLUSIVE DRESSING APPLIED. #22G RAC INITIATED BY JULIANO GARCIA LPN.

## 2021-09-02 NOTE — NUR
PT SITTING IN BED. A&O X4. NO DISTRESS NOTED. PT CURRENTLY ON 4L VIA NC O2
SUSTAINING 96%, O2 TITRATED DOWN TO 2L O2 SUSTAINING 92-94%. O2 TO BE
FREQUENTLY MONITORED FOR ANY TITRATION NEEDS. CLEAR/DIMINISHED BREATH SOUNDS
UPON AUSCULTATION. ACTIVE BOWEL SOUNDS X4 QUADRANTS. PT ABLE TO FOLLOW
COMMANDS. TRIPPLE LUMEN SC INPLACE; IVF CHANGED TO BROWN LUMED, UNABLE TO
FLUSH OTHER LUMENS AT THIS TIME. ASSESSMENT COMPLETED. DISCUSSED POC. CALL
LIGHT WITHIN REACH. ISOLATION PRECAUTIONS IN PLACE.

## 2021-09-03 VITALS — SYSTOLIC BLOOD PRESSURE: 95 MMHG | DIASTOLIC BLOOD PRESSURE: 59 MMHG

## 2021-09-03 VITALS — SYSTOLIC BLOOD PRESSURE: 109 MMHG | DIASTOLIC BLOOD PRESSURE: 68 MMHG

## 2021-09-03 VITALS — DIASTOLIC BLOOD PRESSURE: 68 MMHG | SYSTOLIC BLOOD PRESSURE: 107 MMHG

## 2021-09-03 VITALS — SYSTOLIC BLOOD PRESSURE: 120 MMHG | DIASTOLIC BLOOD PRESSURE: 62 MMHG

## 2021-09-03 LAB
ALBUMIN SERPL-MCNC: 2.8 G/DL (ref 3.2–5)
ALP SERPL-CCNC: 180 U/L (ref 38–126)
ANION GAP SERPL CALCULATED.3IONS-SCNC: 10 MMOL/L
AST SERPL-CCNC: 76 U/L (ref 14–36)
BASOPHILS NFR BLD AUTO: 1 % (ref 0–3)
BUN SERPL-MCNC: 11 MG/DL (ref 7–17)
BUN/CREAT SERPL: 34
CHLORIDE SERPL-SCNC: 106 MMOL/L (ref 95–108)
CO2 SERPL-SCNC: 27 MMOL/L (ref 22–30)
CREAT SERPL-MCNC: 0.3 MG/DL (ref 0.5–1)
CRP SERPL-MCNC: 1.1 MG/DL (ref 0–0.9)
EOSINOPHIL NFR BLD AUTO: 4 % (ref 0–8)
ERYTHROCYTE [DISTWIDTH] IN BLOOD BY AUTOMATED COUNT: 16.2 % (ref 11.5–15.5)
HCT VFR BLD AUTO: 39.9 % (ref 37–47)
HGB BLD-MCNC: 12.5 G/DL (ref 12–16)
IMM GRANULOCYTES NFR BLD: 0.3 % (ref 0–5)
LYMPHOCYTES NFR BLD: 41 % (ref 15–41)
MCH RBC QN AUTO: 29.8 PG  CALC (ref 26–32)
MCHC RBC AUTO-ENTMCNC: 31.3 G/DL CAL (ref 32–36)
MCV RBC AUTO: 95 FL  CALC (ref 80–100)
MONOCYTES NFR BLD AUTO: 9 % (ref 2–13)
NEUTROPHILS # BLD AUTO: 3.09 THOU/UL (ref 2–7.15)
NEUTROPHILS NFR BLD AUTO: 45 % (ref 42–76)
PLATELET # BLD AUTO: 167 THOU/UL (ref 130–400)
POTASSIUM SERPL-SCNC: 4 MMOL/L (ref 3.5–5.1)
PROT SERPL-MCNC: 5.8 G/DL (ref 6.3–8.2)
RBC # BLD AUTO: 4.2 MILL/UL (ref 4.2–5.6)
SODIUM SERPL-SCNC: 139 MMOL/L (ref 137–146)

## 2021-09-03 NOTE — NUR
PATIENT SITTING UP IN BED EATING LUNCH. NO DISTRESS NOTED. CALL LIGHT IN
REACH. WILL CONTINUE TO MONITOR.

## 2021-09-03 NOTE — NUR
Monica was received on [facility]. She was self feeding her breakfast of Greek
toast and oatmeal. Monica demonstrated compensatory strategies while self
feeding such as leaning forward with head and back and resting arm on armrest
to bring food to mouth.
Overall, Monica is demonstrating increased strength and speed with movements
indicating steady progress with each day. Monica practiced reaching overhead
with R > L strength and range.

## 2021-09-03 NOTE — NUR
PT note
Patient presents up in the bedside chair. We worked on DBE as before and
repeated sit to stand. She is given a HEP of DBE, ankle pumps, seated leg
press. She also worked on chair pushups.
Am Pac is 16 indicating she would do well with DC to home as long as family is
willing to assist as she remains unstesady at times and with poor enduramce.
Our plan is to continue progressive functional training

## 2021-09-03 NOTE — NUR
PATIENT RESTING IN BED AWAKE. PATIENT IS ALERT AND ORIENTED X3. SHIFT
ASSESSMENT COMPLETED AT SI TIME. IV PATENT X1. PATIENT ASSISTED UP TO
RECLINER AT BEDSIDE. PATIENT SET UP FOR AM MEAL. CALL LIGHT IN REACH. WILL
CONTINUE TO MONITOR.

## 2021-09-11 NOTE — NUR
CALLED [employer] FOR [employer] FOR PICKUP. AIRMATRESS NUMBER [id number] TALKED
TO IDRIS AND WAS GIVEN CONFIRMATION NUMBER [id number] STATED SHE WILL PAGE OUT
TECHNICIAN ABOUT AIR MATRESS AND ALSO PAGE OUT BILLING TEAM DUE TO PT BEING
DISCHARGED ON SEP. 3RD AND NOT NOTIFIED UNTIL NOW ABOUT AIR MATRESS NEEDING TO
BE PICKED UP.

## 2021-09-28 ENCOUNTER — HOSPITAL ENCOUNTER (EMERGENCY)
Dept: HOSPITAL 82 - ED | Age: 56
Discharge: HOME | DRG: 552 | End: 2021-09-28
Payer: SELF-PAY

## 2021-09-28 VITALS — SYSTOLIC BLOOD PRESSURE: 148 MMHG | DIASTOLIC BLOOD PRESSURE: 78 MMHG

## 2021-09-28 VITALS — BODY MASS INDEX: 28.98 KG/M2 | HEIGHT: 64 IN | WEIGHT: 169.76 LBS

## 2021-09-28 DIAGNOSIS — E11.40: ICD-10-CM
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
## 2021-09-28 DIAGNOSIS — M19.90: ICD-10-CM

## 2021-09-28 DIAGNOSIS — M35.00: ICD-10-CM

## 2021-09-28 DIAGNOSIS — Z86.16: ICD-10-CM

## 2021-09-28 DIAGNOSIS — M79.7: ICD-10-CM

## 2021-09-28 DIAGNOSIS — M25.561: ICD-10-CM

## 2021-09-28 DIAGNOSIS — M06.9: ICD-10-CM

## 2021-09-28 DIAGNOSIS — Z79.4: ICD-10-CM
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
## 2021-09-28 DIAGNOSIS — S33.5XXA: Primary | ICD-10-CM

## 2021-09-28 DIAGNOSIS — M81.0: ICD-10-CM

## 2021-09-28 DIAGNOSIS — M25.562: ICD-10-CM

## 2021-09-28 DIAGNOSIS — F32.9: ICD-10-CM
